# Patient Record
Sex: MALE | Race: WHITE
[De-identification: names, ages, dates, MRNs, and addresses within clinical notes are randomized per-mention and may not be internally consistent; named-entity substitution may affect disease eponyms.]

---

## 2021-11-30 ENCOUNTER — HOSPITAL ENCOUNTER (INPATIENT)
Dept: HOSPITAL 54 - ER | Age: 81
LOS: 15 days | Discharge: NURSING, REHAB OR LONG-TERM CARE | DRG: 881 | End: 2021-12-15
Attending: NURSE PRACTITIONER | Admitting: NURSE PRACTITIONER
Payer: MEDICARE

## 2021-11-30 VITALS — BODY MASS INDEX: 24.38 KG/M2 | WEIGHT: 180 LBS | HEIGHT: 72 IN

## 2021-11-30 DIAGNOSIS — G40.909: ICD-10-CM

## 2021-11-30 DIAGNOSIS — Z88.8: ICD-10-CM

## 2021-11-30 DIAGNOSIS — Z91.048: ICD-10-CM

## 2021-11-30 DIAGNOSIS — M20.42: ICD-10-CM

## 2021-11-30 DIAGNOSIS — F02.81: ICD-10-CM

## 2021-11-30 DIAGNOSIS — N17.0: ICD-10-CM

## 2021-11-30 DIAGNOSIS — F32.9: Primary | ICD-10-CM

## 2021-11-30 DIAGNOSIS — N18.9: ICD-10-CM

## 2021-11-30 DIAGNOSIS — S90.422A: ICD-10-CM

## 2021-11-30 DIAGNOSIS — F29: ICD-10-CM
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
## 2021-11-30 DIAGNOSIS — Z73.6: ICD-10-CM
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
## 2021-11-30 DIAGNOSIS — Z79.82: ICD-10-CM

## 2021-11-30 DIAGNOSIS — E78.5: ICD-10-CM

## 2021-11-30 DIAGNOSIS — G30.9: ICD-10-CM

## 2021-11-30 DIAGNOSIS — Z79.899: ICD-10-CM

## 2021-11-30 DIAGNOSIS — M20.41: ICD-10-CM

## 2021-11-30 DIAGNOSIS — F41.9: ICD-10-CM

## 2021-11-30 LAB
ALBUMIN SERPL BCP-MCNC: 4.3 G/DL (ref 3.4–5)
ALP SERPL-CCNC: 85 U/L (ref 46–116)
ALT SERPL W P-5'-P-CCNC: 41 U/L (ref 12–78)
APAP SERPL-MCNC: < 2 UG/ML (ref 10–30)
AST SERPL W P-5'-P-CCNC: 30 U/L (ref 15–37)
BASOPHILS # BLD AUTO: 0 K/UL (ref 0–0.2)
BASOPHILS NFR BLD AUTO: 0.4 % (ref 0–2)
BILIRUB DIRECT SERPL-MCNC: 0.2 MG/DL (ref 0–0.2)
BILIRUB SERPL-MCNC: 0.6 MG/DL (ref 0.2–1)
BILIRUB UR QL STRIP: (no result)
BUN SERPL-MCNC: 30 MG/DL (ref 7–18)
CALCIUM SERPL-MCNC: 9.4 MG/DL (ref 8.5–10.1)
CHLORIDE SERPL-SCNC: 104 MMOL/L (ref 98–107)
CO2 SERPL-SCNC: 28 MMOL/L (ref 21–32)
COLOR UR: YELLOW
CREAT SERPL-MCNC: 1.6 MG/DL (ref 0.6–1.3)
EOSINOPHIL NFR BLD AUTO: 1.6 % (ref 0–6)
ETHANOL SERPL-MCNC: < 3 MG/DL (ref 0–0)
GLUCOSE SERPL-MCNC: 103 MG/DL (ref 74–106)
HCT VFR BLD AUTO: 41 % (ref 39–51)
HGB BLD-MCNC: 13.6 G/DL (ref 13.5–17.5)
LYMPHOCYTES NFR BLD AUTO: 2.1 K/UL (ref 0.8–4.8)
LYMPHOCYTES NFR BLD AUTO: 20 % (ref 20–44)
MCHC RBC AUTO-ENTMCNC: 33 G/DL (ref 31–36)
MCV RBC AUTO: 94 FL (ref 80–96)
MONOCYTES NFR BLD AUTO: 0.9 K/UL (ref 0.1–1.3)
MONOCYTES NFR BLD AUTO: 8.7 % (ref 2–12)
NEUTROPHILS # BLD AUTO: 7.2 K/UL (ref 1.8–8.9)
NEUTROPHILS NFR BLD AUTO: 69.3 % (ref 43–81)
PH UR STRIP: 5.5 [PH] (ref 5–8)
PLATELET # BLD AUTO: 166 K/UL (ref 150–450)
POTASSIUM SERPL-SCNC: 4 MMOL/L (ref 3.5–5.1)
PROT SERPL-MCNC: 7.9 G/DL (ref 6.4–8.2)
RBC # BLD AUTO: 4.42 MIL/UL (ref 4.5–6)
SODIUM SERPL-SCNC: 142 MMOL/L (ref 136–145)
UROBILINOGEN UR STRIP-MCNC: 1 EU/DL
WBC #/AREA URNS HPF: (no result) /HPF (ref 0–3)
WBC NRBC COR # BLD AUTO: 10.4 K/UL (ref 4.3–11)

## 2021-11-30 PROCEDURE — G0480 DRUG TEST DEF 1-7 CLASSES: HCPCS

## 2021-11-30 RX ADMIN — TEMAZEPAM PRN MG: 7.5 CAPSULE ORAL at 22:27

## 2021-11-30 NOTE — NUR
5150: Patient will be admitted to [facility] for acute inpatient psychiatric treatment 
under the care of Dr. Roper on a 5150 as GD WRITTEN BY KALLI QUIROZ

## 2021-11-30 NOTE — NUR
PT BIB WIFE FROM [facility]ARE FACILITY FOR MEDICAL CLEARANCE PRIOR TO [facility] ADMISSION DUE 
TO AGITATION/AGGRESSIVENESS. PT A/OX2 WITH SOME CONFUSION. WIFE AT PT'S 
BEDSIDE. SAFETY MEASURES IN PLACE. PT COMPLIANT WITH CARE AT THIS TIME. 
CONNECTED PT TO POX AND MONITOR.

## 2021-12-01 VITALS — DIASTOLIC BLOOD PRESSURE: 78 MMHG | SYSTOLIC BLOOD PRESSURE: 137 MMHG

## 2021-12-01 VITALS — SYSTOLIC BLOOD PRESSURE: 134 MMHG | DIASTOLIC BLOOD PRESSURE: 58 MMHG

## 2021-12-01 VITALS — DIASTOLIC BLOOD PRESSURE: 70 MMHG | SYSTOLIC BLOOD PRESSURE: 132 MMHG

## 2021-12-01 VITALS — SYSTOLIC BLOOD PRESSURE: 119 MMHG | DIASTOLIC BLOOD PRESSURE: 72 MMHG

## 2021-12-01 LAB
ALBUMIN SERPL BCP-MCNC: 3.6 G/DL (ref 3.4–5)
ALP SERPL-CCNC: 75 U/L (ref 46–116)
ALT SERPL W P-5'-P-CCNC: 35 U/L (ref 12–78)
AST SERPL W P-5'-P-CCNC: 24 U/L (ref 15–37)
BILIRUB SERPL-MCNC: 0.6 MG/DL (ref 0.2–1)
BUN SERPL-MCNC: 28 MG/DL (ref 7–18)
CALCIUM SERPL-MCNC: 8.6 MG/DL (ref 8.5–10.1)
CHLORIDE SERPL-SCNC: 106 MMOL/L (ref 98–107)
CHOLEST SERPL-MCNC: 139 MG/DL (ref ?–200)
CO2 SERPL-SCNC: 27 MMOL/L (ref 21–32)
CREAT SERPL-MCNC: 1.4 MG/DL (ref 0.6–1.3)
GLUCOSE SERPL-MCNC: 92 MG/DL (ref 74–106)
HDLC SERPL-MCNC: 51 MG/DL (ref 40–60)
LDLC SERPL DIRECT ASSAY-MCNC: 70 MG/DL (ref 0–99)
POTASSIUM SERPL-SCNC: 3.7 MMOL/L (ref 3.5–5.1)
PROT SERPL-MCNC: 6.7 G/DL (ref 6.4–8.2)
SODIUM SERPL-SCNC: 140 MMOL/L (ref 136–145)
TRIGL SERPL-MCNC: 101 MG/DL (ref 30–150)

## 2021-12-01 RX ADMIN — TEMAZEPAM PRN MG: 7.5 CAPSULE ORAL at 20:33

## 2021-12-01 RX ADMIN — MEMANTINE HYDROCHLORIDE SCH MG: 5 TABLET ORAL at 09:13

## 2021-12-01 RX ADMIN — ESCITALOPRAM OXALATE SCH MG: 10 TABLET, FILM COATED ORAL at 09:13

## 2021-12-01 RX ADMIN — QUETIAPINE SCH MG: 25 TABLET, FILM COATED ORAL at 09:12

## 2021-12-01 RX ADMIN — QUETIAPINE SCH MG: 25 TABLET, FILM COATED ORAL at 17:10

## 2021-12-01 RX ADMIN — MEMANTINE HYDROCHLORIDE SCH MG: 5 TABLET ORAL at 17:10

## 2021-12-01 RX ADMIN — LEVETIRACETAM SCH MG: 250 TABLET, FILM COATED ORAL at 12:45

## 2021-12-01 RX ADMIN — GALANTAMINE HYDROBROMIDE SCH MG: 4 TABLET, FILM COATED ORAL at 18:48

## 2021-12-01 RX ADMIN — SIMVASTATIN SCH MG: 20 TABLET, FILM COATED ORAL at 17:12
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
## 2021-12-01 NOTE — NUR
SW Note:

SW met with patient to conduct therapy due to pt being tearful and sad. Pt appeared to be 
very confused and disorganized. Pt appeared frightful and stated "people are after him". SW 
attempted to comfort pt and pt appeared to be more cooperative and felt safe.

## 2021-12-01 NOTE — NUR
LIZBETH Family Contact:



LIZBETH spoke with patient's wife Samantha ([phone]) and gathered collateral. Wife expressed 
that pt has been diagnosed with Alzheimer for 5 years. She expressed that she is the DPOA 
and will send this writer documents. Wife wants pt back to [place] upon dc.
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
## 2021-12-01 NOTE — NUR
DPOA Document:



Patient's wife Samantha ([phone]) sent DPOA documents to this SW. SW placed in the chart.

## 2021-12-01 NOTE — NUR
RN-NOTES

NOTED PATIENT VERY ANXIOUS ,AGITATED REDIRECTED BUT PATIENT SWING HIS RIGHT HAND TO THE 
WRITER. KLONOPIN 0.25MG P.O GIVEN AS PRN ORDER. WILL CONT.MONITORING FOR SAFETY AND 
BEHAVIOR. ALL NEEDS ATTENDED AND ANTICIPATED.

## 2021-12-01 NOTE — NUR
LIZBETH Initial Discharge Plan:



Patient currently resides at [facility]. LIZBETH attempted to contact patient's wife Samantha 
([phone]) to gather collateral, unavailable, SW left a voicemail. LIZBETH spoke with Flori QUIROZ 
from [facility] ([phone]) who stated pt is welcomed back once stable. LIZBETH will work 
with the treatment team and family to coordinate appropriate treatment.

## 2021-12-01 NOTE — NUR
[facility]:



SW spoke with Flori QUIROZ from [facility] ([phone]) who stated pt is welcomed back 
once stable.

## 2021-12-01 NOTE — NUR
LIZBETH Note:



Patient's wife Samantha sent this writer list of meds, SW placed in chart.





         Medication List  (May 2021)



Levetiracetam ER (Keppra XR) - 750 mg/day

Escitalopram (Lexapro) - 30mg/day

Simvastatin (Zocor) - 20mg/day

Namenda XR - 28mg/day

Galantamine ER (Razadyne ER) - 24mg/day

Aspirin - 81mg/day

Multi-Vitamin (Mens Senior) - 1/day

Vitamin D3 - 2000iu/day

Fish Oil - 1000mg/day





          Occasionally (As Needed)



Tylenol

Allegra -12 hr

Mucinex - 12hr

Zicam (Zinc Lozenges)

Anti-itch Cream - (topical dry skin treatment)

## 2021-12-01 NOTE — NUR
LIZBETH Family Contact:



SW attempted to contact patient's wife Samantha ([phone]) to gather collateral, 
unavailable, SW left a voicemail.

## 2021-12-02 VITALS — SYSTOLIC BLOOD PRESSURE: 102 MMHG | DIASTOLIC BLOOD PRESSURE: 72 MMHG

## 2021-12-02 VITALS — DIASTOLIC BLOOD PRESSURE: 85 MMHG | SYSTOLIC BLOOD PRESSURE: 137 MMHG

## 2021-12-02 VITALS — DIASTOLIC BLOOD PRESSURE: 74 MMHG | SYSTOLIC BLOOD PRESSURE: 137 MMHG

## 2021-12-02 RX ADMIN — DIVALPROEX SODIUM SCH MG: 125 CAPSULE ORAL at 20:46

## 2021-12-02 RX ADMIN — QUETIAPINE SCH MG: 25 TABLET, FILM COATED ORAL at 10:04

## 2021-12-02 RX ADMIN — GALANTAMINE HYDROBROMIDE SCH MG: 4 TABLET, FILM COATED ORAL at 10:04

## 2021-12-02 RX ADMIN — VITAMIN D, TAB 1000IU (100/BT) SCH UNIT: 25 TAB at 10:03

## 2021-12-02 RX ADMIN — SIMVASTATIN SCH MG: 20 TABLET, FILM COATED ORAL at 17:34

## 2021-12-02 RX ADMIN — DIVALPROEX SODIUM SCH MG: 125 CAPSULE ORAL at 10:02

## 2021-12-02 RX ADMIN — ESCITALOPRAM OXALATE SCH MG: 10 TABLET, FILM COATED ORAL at 10:03

## 2021-12-02 RX ADMIN — QUETIAPINE SCH MG: 25 TABLET, FILM COATED ORAL at 16:23

## 2021-12-02 RX ADMIN — Medication SCH MG: at 10:03

## 2021-12-02 RX ADMIN — LEVETIRACETAM SCH MG: 250 TABLET, FILM COATED ORAL at 10:03

## 2021-12-02 RX ADMIN — MEMANTINE HYDROCHLORIDE SCH MG: 5 TABLET ORAL at 16:23

## 2021-12-02 RX ADMIN — THERA TABS SCH UDTAB: TAB at 10:03

## 2021-12-02 RX ADMIN — MEMANTINE HYDROCHLORIDE SCH MG: 5 TABLET ORAL at 10:03

## 2021-12-02 RX ADMIN — GALANTAMINE HYDROBROMIDE SCH MG: 4 TABLET, FILM COATED ORAL at 16:23

## 2021-12-02 NOTE — NUR
RN-NOTES

NOTED PATIENT VERY ANXIOUS TRYING TO OPEN MAIN DOOR OF THE UNIT. REDIRECTED AND REORIENTED, 
KLONOPIN 0.25MG P.O GIVEN AS PRN ORDER. WILL CONT.MONITORING FOR SAFETY AND BEHAVIOR. ALL 
NEEDS ATTENDED AND ANTICIPATED.

## 2021-12-03 VITALS — SYSTOLIC BLOOD PRESSURE: 147 MMHG | DIASTOLIC BLOOD PRESSURE: 79 MMHG

## 2021-12-03 VITALS — SYSTOLIC BLOOD PRESSURE: 144 MMHG | DIASTOLIC BLOOD PRESSURE: 79 MMHG

## 2021-12-03 VITALS — SYSTOLIC BLOOD PRESSURE: 130 MMHG | DIASTOLIC BLOOD PRESSURE: 74 MMHG

## 2021-12-03 LAB
BUN SERPL-MCNC: 29 MG/DL (ref 7–18)
CALCIUM SERPL-MCNC: 9 MG/DL (ref 8.5–10.1)
CHLORIDE SERPL-SCNC: 103 MMOL/L (ref 98–107)
CO2 SERPL-SCNC: 27 MMOL/L (ref 21–32)
CREAT SERPL-MCNC: 1.6 MG/DL (ref 0.6–1.3)
GLUCOSE SERPL-MCNC: 93 MG/DL (ref 74–106)
POTASSIUM SERPL-SCNC: 3.7 MMOL/L (ref 3.5–5.1)
SODIUM SERPL-SCNC: 140 MMOL/L (ref 136–145)

## 2021-12-03 RX ADMIN — GALANTAMINE HYDROBROMIDE SCH MG: 4 TABLET, FILM COATED ORAL at 09:44

## 2021-12-03 RX ADMIN — SIMVASTATIN SCH MG: 20 TABLET, FILM COATED ORAL at 17:16

## 2021-12-03 RX ADMIN — DIVALPROEX SODIUM SCH MG: 125 CAPSULE ORAL at 21:58

## 2021-12-03 RX ADMIN — ACETAMINOPHEN PRN MG: 325 TABLET ORAL at 11:18

## 2021-12-03 RX ADMIN — THERA TABS SCH UDTAB: TAB at 09:44

## 2021-12-03 RX ADMIN — MEMANTINE HYDROCHLORIDE SCH MG: 5 TABLET ORAL at 16:25

## 2021-12-03 RX ADMIN — QUETIAPINE SCH MG: 25 TABLET, FILM COATED ORAL at 16:25

## 2021-12-03 RX ADMIN — Medication SCH MG: at 09:44

## 2021-12-03 RX ADMIN — VITAMIN D, TAB 1000IU (100/BT) SCH UNIT: 25 TAB at 09:44

## 2021-12-03 RX ADMIN — LEVETIRACETAM SCH MG: 250 TABLET, FILM COATED ORAL at 09:43

## 2021-12-03 RX ADMIN — QUETIAPINE SCH MG: 25 TABLET, FILM COATED ORAL at 09:44

## 2021-12-03 RX ADMIN — GALANTAMINE HYDROBROMIDE SCH MG: 4 TABLET, FILM COATED ORAL at 16:25

## 2021-12-03 RX ADMIN — MEMANTINE HYDROCHLORIDE SCH MG: 5 TABLET ORAL at 09:44

## 2021-12-03 RX ADMIN — ESCITALOPRAM OXALATE SCH MG: 10 TABLET, FILM COATED ORAL at 09:44

## 2021-12-03 RX ADMIN — DIVALPROEX SODIUM SCH MG: 125 CAPSULE ORAL at 09:43

## 2021-12-03 NOTE — NUR
RN-NOTES

PATIENT IN THE DAY ROOM UP IN THE CHAIR WITH TABLE,AWAKE,ALERT TO NAME ONLY,GUARDED,CALM AND 
QUIET. NO ACUTE DISTRESS NOTED.  ENDORSED TO INCOMING NURSE FOR MONITORING AND CONTINUITY OF 
CARE.

## 2021-12-03 NOTE — NUR
RECEIVED PATIENT IN HALLWAY SITTING IN LEANA CHAIR, AWAKE A/O X1. BLUNTED AFFECT, BANGING ON 
CHAIR, YELLING, AGITATED, DISORGANIZED, CONFUSED, REFUSING REDIRECTION. POOR INSIGHT, POOR 
JUDGEMENT. IM ATIVAN 0.5ML, BENADRYL 25ML AND HALDOL 5ML GIVEN BY AM NURSE. WILL CONTINUE TO 
MONITOR Q15 MIN FOR MOOD, SAFETY AND BEHAVIOR.

## 2021-12-03 NOTE — NUR
[facility]PS RN NOTES:

PATIENT IS SITTING IN LEANA CHAIR IN DAY ROOM, AWAKE, A/O X1, CALM AND COOPERATIVE, ABLE TO 
FOLLOW COMMAND AND NO LONGER AGITATED OR AGGRESSIVE. V/S WNL. NO S/S OF RESPIRATORY 
DISTRESS. OFFERED SNACKS AND FLUID AS TOLERATED. WILL CONTINUE TO MONITOR.

## 2021-12-03 NOTE — NUR
RN-NOTES

 PATIENT C/O LOWER BACK PAIN. TYLENOL 650MG P.O GIVEN AS PRN ORDER. WILL CONT. MONITORING 
FOR SAFETY AND BEHAVIOR.

## 2021-12-03 NOTE — NUR
RN-NOTES

 PATIENT'S WIFE  GERARDO LUGO  [phone] MADE AWARE OF PATIENT'S BEHAVIOR AND THAT IM 
MEDICATIONS WAS GIVEN.
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
## 2021-12-03 NOTE — NUR
Pt. is very aggressive, screaming and yelling to staffs, fighting and posturing to the 
staffs. Louis MATA made aware and ordered Haldol 2 mg IM and Benadryl 25 mg IM.

## 2021-12-03 NOTE — NUR
RN CLOSING NOTE 



PATIENT AWAKE IN ROOM, BREATHING EVENLY AND UNLABORED, NO S/S OF DISTRESS OR SOB NOTED. 
PATIENT SLEPT WELL THROUGH THE NIGHT, NO SIGNIFICANT CHANGES AND NO BEHAVIORAL ISSUES NOTED. 
PATIENT WAS TEARFUL AT THE BEGINNING OF SHIFT, STATED HE WAS WAITING FOR HIS DAD BECAUSE HE 
HADN'T COME TO SEE HIM IN WEEKS. PT WAS REDIRECTABLE AND WENT TO SLEEP. PT WAS MED 
COMPLIANT, MEDICATIONS GIVEN AS ORDERED AND PATIENT NEEDS MET THROUGHOUT SHIFT. ENCOURAGED 
PO HYDRATION AND GAVE PATIENT SNACK. PT VERY CONFUSED THIS MORNING, KEPT ASKING HOW HE COULD 
GO DOWNSTAIRS AND LEAVE EVEN AFTER REORIENTING AND EXPLAINING THE SITUATION TO PATIENT. 
SAFETY MEASURES IN PLACE: BED LOCKED IN LOW POSITION, SIDE RAILS UP X 2, Q15 MIN CHECKS FOR 
SAFETY. WILL ENDORSE TO DAY SHIFT NURSE FOR CONTINUITY OF CARE.

## 2021-12-04 VITALS — SYSTOLIC BLOOD PRESSURE: 101 MMHG | DIASTOLIC BLOOD PRESSURE: 70 MMHG

## 2021-12-04 VITALS — DIASTOLIC BLOOD PRESSURE: 78 MMHG | SYSTOLIC BLOOD PRESSURE: 132 MMHG

## 2021-12-04 VITALS — SYSTOLIC BLOOD PRESSURE: 112 MMHG | DIASTOLIC BLOOD PRESSURE: 57 MMHG

## 2021-12-04 LAB
BASOPHILS # BLD AUTO: 0 K/UL (ref 0–0.2)
BASOPHILS NFR BLD AUTO: 0.3 % (ref 0–2)
BUN SERPL-MCNC: 29 MG/DL (ref 7–18)
CALCIUM SERPL-MCNC: 8.5 MG/DL (ref 8.5–10.1)
CHLORIDE SERPL-SCNC: 105 MMOL/L (ref 98–107)
CO2 SERPL-SCNC: 29 MMOL/L (ref 21–32)
CREAT SERPL-MCNC: 1.5 MG/DL (ref 0.6–1.3)
EOSINOPHIL NFR BLD AUTO: 4 % (ref 0–6)
GLUCOSE SERPL-MCNC: 92 MG/DL (ref 74–106)
HCT VFR BLD AUTO: 36 % (ref 39–51)
HGB BLD-MCNC: 12 G/DL (ref 13.5–17.5)
LYMPHOCYTES NFR BLD AUTO: 2 K/UL (ref 0.8–4.8)
LYMPHOCYTES NFR BLD AUTO: 30.1 % (ref 20–44)
MCHC RBC AUTO-ENTMCNC: 34 G/DL (ref 31–36)
MCV RBC AUTO: 92 FL (ref 80–96)
MONOCYTES NFR BLD AUTO: 0.8 K/UL (ref 0.1–1.3)
MONOCYTES NFR BLD AUTO: 11.1 % (ref 2–12)
NEUTROPHILS # BLD AUTO: 3.7 K/UL (ref 1.8–8.9)
NEUTROPHILS NFR BLD AUTO: 54.5 % (ref 43–81)
PLATELET # BLD AUTO: 134 K/UL (ref 150–450)
POTASSIUM SERPL-SCNC: 3.9 MMOL/L (ref 3.5–5.1)
RBC # BLD AUTO: 3.84 MIL/UL (ref 4.5–6)
SODIUM SERPL-SCNC: 140 MMOL/L (ref 136–145)
WBC NRBC COR # BLD AUTO: 6.8 K/UL (ref 4.3–11)

## 2021-12-04 RX ADMIN — MEMANTINE HYDROCHLORIDE SCH MG: 5 TABLET ORAL at 17:21

## 2021-12-04 RX ADMIN — ESCITALOPRAM OXALATE SCH MG: 10 TABLET, FILM COATED ORAL at 08:37

## 2021-12-04 RX ADMIN — MEMANTINE HYDROCHLORIDE SCH MG: 5 TABLET ORAL at 08:38

## 2021-12-04 RX ADMIN — SIMVASTATIN SCH MG: 20 TABLET, FILM COATED ORAL at 17:20

## 2021-12-04 RX ADMIN — GALANTAMINE HYDROBROMIDE SCH MG: 4 TABLET, FILM COATED ORAL at 08:37

## 2021-12-04 RX ADMIN — Medication SCH MG: at 08:37

## 2021-12-04 RX ADMIN — DIVALPROEX SODIUM SCH MG: 125 CAPSULE ORAL at 21:07

## 2021-12-04 RX ADMIN — THERA TABS SCH UDTAB: TAB at 08:37

## 2021-12-04 RX ADMIN — QUETIAPINE SCH MG: 25 TABLET, FILM COATED ORAL at 17:20

## 2021-12-04 RX ADMIN — QUETIAPINE SCH MG: 25 TABLET, FILM COATED ORAL at 08:38

## 2021-12-04 RX ADMIN — VITAMIN D, TAB 1000IU (100/BT) SCH UNIT: 25 TAB at 08:36

## 2021-12-04 RX ADMIN — TEMAZEPAM PRN MG: 7.5 CAPSULE ORAL at 21:07

## 2021-12-04 RX ADMIN — GALANTAMINE HYDROBROMIDE SCH MG: 4 TABLET, FILM COATED ORAL at 17:19

## 2021-12-04 RX ADMIN — LEVETIRACETAM SCH MG: 250 TABLET, FILM COATED ORAL at 08:37

## 2021-12-04 RX ADMIN — DIVALPROEX SODIUM SCH MG: 125 CAPSULE ORAL at 08:38

## 2021-12-05 VITALS — DIASTOLIC BLOOD PRESSURE: 63 MMHG | SYSTOLIC BLOOD PRESSURE: 105 MMHG

## 2021-12-05 VITALS — SYSTOLIC BLOOD PRESSURE: 139 MMHG | DIASTOLIC BLOOD PRESSURE: 87 MMHG

## 2021-12-05 VITALS — DIASTOLIC BLOOD PRESSURE: 91 MMHG | SYSTOLIC BLOOD PRESSURE: 146 MMHG

## 2021-12-05 RX ADMIN — QUETIAPINE SCH MG: 25 TABLET, FILM COATED ORAL at 08:09

## 2021-12-05 RX ADMIN — GALANTAMINE HYDROBROMIDE SCH MG: 4 TABLET, FILM COATED ORAL at 08:10

## 2021-12-05 RX ADMIN — VITAMIN D, TAB 1000IU (100/BT) SCH UNIT: 25 TAB at 08:09

## 2021-12-05 RX ADMIN — DIVALPROEX SODIUM SCH MG: 125 CAPSULE ORAL at 20:43

## 2021-12-05 RX ADMIN — GALANTAMINE HYDROBROMIDE SCH MG: 4 TABLET, FILM COATED ORAL at 17:15

## 2021-12-05 RX ADMIN — MEMANTINE HYDROCHLORIDE SCH MG: 5 TABLET ORAL at 08:10

## 2021-12-05 RX ADMIN — LEVETIRACETAM SCH MG: 250 TABLET, FILM COATED ORAL at 08:09

## 2021-12-05 RX ADMIN — DIVALPROEX SODIUM SCH MG: 125 CAPSULE ORAL at 08:09

## 2021-12-05 RX ADMIN — ESCITALOPRAM OXALATE SCH MG: 10 TABLET, FILM COATED ORAL at 08:09

## 2021-12-05 RX ADMIN — ACETAMINOPHEN PRN MG: 325 TABLET ORAL at 20:45

## 2021-12-05 RX ADMIN — SIMVASTATIN SCH MG: 20 TABLET, FILM COATED ORAL at 17:15

## 2021-12-05 RX ADMIN — TEMAZEPAM PRN MG: 7.5 CAPSULE ORAL at 21:27

## 2021-12-05 RX ADMIN — MEMANTINE HYDROCHLORIDE SCH MG: 5 TABLET ORAL at 17:15

## 2021-12-05 RX ADMIN — Medication SCH MG: at 08:09

## 2021-12-05 RX ADMIN — THERA TABS SCH UDTAB: TAB at 08:09

## 2021-12-05 RX ADMIN — QUETIAPINE SCH MG: 25 TABLET, FILM COATED ORAL at 17:15

## 2021-12-06 VITALS — DIASTOLIC BLOOD PRESSURE: 68 MMHG | SYSTOLIC BLOOD PRESSURE: 129 MMHG

## 2021-12-06 VITALS — DIASTOLIC BLOOD PRESSURE: 56 MMHG | SYSTOLIC BLOOD PRESSURE: 127 MMHG

## 2021-12-06 VITALS — DIASTOLIC BLOOD PRESSURE: 60 MMHG | SYSTOLIC BLOOD PRESSURE: 110 MMHG

## 2021-12-06 LAB
BILIRUB UR QL STRIP: NEGATIVE
COLOR UR: YELLOW
DEPRECATED SQUAMOUS URNS QL MICRO: (no result) /HPF
GLUCOSE UR STRIP-MCNC: NEGATIVE MG/DL
LEUKOCYTE ESTERASE UR QL STRIP: NEGATIVE
NITRITE UR QL STRIP: NEGATIVE
PH UR STRIP: 6 [PH] (ref 5–8)
PROT UR QL STRIP: NEGATIVE MG/DL
RBC #/AREA URNS HPF: (no result) /HPF (ref 0–2)
UROBILINOGEN UR STRIP-MCNC: 0.2 EU/DL
WBC #/AREA URNS HPF: (no result) /HPF
WBC #/AREA URNS HPF: (no result) /HPF (ref 0–3)

## 2021-12-06 RX ADMIN — GALANTAMINE HYDROBROMIDE SCH MG: 4 TABLET, FILM COATED ORAL at 16:34

## 2021-12-06 RX ADMIN — SIMVASTATIN SCH MG: 20 TABLET, FILM COATED ORAL at 17:00

## 2021-12-06 RX ADMIN — GALANTAMINE HYDROBROMIDE SCH MG: 4 TABLET, FILM COATED ORAL at 08:51

## 2021-12-06 RX ADMIN — QUETIAPINE SCH MG: 25 TABLET, FILM COATED ORAL at 08:54

## 2021-12-06 RX ADMIN — QUETIAPINE SCH MG: 25 TABLET, FILM COATED ORAL at 21:10

## 2021-12-06 RX ADMIN — DIVALPROEX SODIUM SCH MG: 125 CAPSULE ORAL at 21:10

## 2021-12-06 RX ADMIN — VITAMIN D, TAB 1000IU (100/BT) SCH UNIT: 25 TAB at 08:52

## 2021-12-06 RX ADMIN — Medication SCH MG: at 08:53

## 2021-12-06 RX ADMIN — LEVETIRACETAM SCH MG: 250 TABLET, FILM COATED ORAL at 08:52

## 2021-12-06 RX ADMIN — TEMAZEPAM PRN MG: 7.5 CAPSULE ORAL at 21:20

## 2021-12-06 RX ADMIN — MEMANTINE HYDROCHLORIDE SCH MG: 5 TABLET ORAL at 16:34

## 2021-12-06 RX ADMIN — ESCITALOPRAM OXALATE SCH MG: 10 TABLET, FILM COATED ORAL at 08:53

## 2021-12-06 RX ADMIN — MEMANTINE HYDROCHLORIDE SCH MG: 5 TABLET ORAL at 08:53

## 2021-12-06 RX ADMIN — DIVALPROEX SODIUM SCH MG: 125 CAPSULE ORAL at 08:52

## 2021-12-06 RX ADMIN — THERA TABS SCH UDTAB: TAB at 08:53

## 2021-12-06 NOTE — NUR
[facility]PS RN NOTES:

WEEKLY SKIN ASSESSMENT DONE. PATIENT HAD NEW SKIN ISSUES: SACRAL REDNESS, OLD R/L LOWER ARM 
SCABS AND BRUISES. WOUND CONSULT ORDERED, PICTURES TAKEN AND PLACED IN PATIENT CHART.

## 2021-12-06 NOTE — NUR
AT APPROXIMATELY 1646 PM, THE PT WAS FOUND BY THE CNA AND RN SITTING DOWN ON THE FLOOR WITH 
HIS HANDS ON THE FLOOR AND WITH HIS BACK AND HEAD LEANING AGAINST THE TABLE .  NO SIGNS OF 
DISTRESS, NO BLEEDING OR INJURY NOTED. PT REGAINED GAIT AND WAS PLACED IN CHAIR.  STAT CT 
SCAN OF THE HEAD WAS ORDERED BY DNP. PT UNCOOPERATIVE IN RADIOLOGY ROOM. UNABLE TO DO CT 
SCAN. DNP AND CHARGE NURSE AWARE. PER DNP, OBSERVE PT FOR SIGNS AND SYMPTOMS OF BLEEDING, 
CONFUSION AND ENCEPHALOPATHY. SUPERVISOR AND MANAGER AWARE OF THE FALL. VS STABLE /77 
HR 99

## 2021-12-06 NOTE — NUR
GPS RICHIE NOTE

PT IN G/C UNABLE TO GO TO SLEEP, RESTORIL 25 MG PO GIVEN. CONTINUE TO MONITOR HIM. 

-------------------------------------------------------------------------------

Addendum: 12/07/21 at 0536 by FRITZ BROWN RN

-------------------------------------------------------------------------------

DUPILACATE ENTRY

## 2021-12-06 NOTE — NUR
GPS RN NOTE

PT IN G/C RESTLESS, AND UNABLE TO SLEEP, RETORIL 7.5 MG PO GIVEN. CONTINUE TO MONITOR HIM.

## 2021-12-07 VITALS — DIASTOLIC BLOOD PRESSURE: 68 MMHG | SYSTOLIC BLOOD PRESSURE: 131 MMHG

## 2021-12-07 VITALS — DIASTOLIC BLOOD PRESSURE: 59 MMHG | SYSTOLIC BLOOD PRESSURE: 136 MMHG

## 2021-12-07 RX ADMIN — MEMANTINE HYDROCHLORIDE SCH MG: 5 TABLET ORAL at 09:03

## 2021-12-07 RX ADMIN — DIVALPROEX SODIUM SCH MG: 125 CAPSULE ORAL at 09:03

## 2021-12-07 RX ADMIN — VITAMIN D, TAB 1000IU (100/BT) SCH UNIT: 25 TAB at 09:03

## 2021-12-07 RX ADMIN — QUETIAPINE SCH MG: 25 TABLET, FILM COATED ORAL at 21:25

## 2021-12-07 RX ADMIN — DIVALPROEX SODIUM SCH MG: 250 TABLET, DELAYED RELEASE ORAL at 17:32

## 2021-12-07 RX ADMIN — ESCITALOPRAM OXALATE SCH MG: 10 TABLET, FILM COATED ORAL at 09:02

## 2021-12-07 RX ADMIN — GALANTAMINE HYDROBROMIDE SCH MG: 4 TABLET, FILM COATED ORAL at 17:32

## 2021-12-07 RX ADMIN — MEMANTINE HYDROCHLORIDE SCH MG: 5 TABLET ORAL at 17:32

## 2021-12-07 RX ADMIN — THERA TABS SCH UDTAB: TAB at 09:03

## 2021-12-07 RX ADMIN — LEVETIRACETAM SCH MG: 250 TABLET, FILM COATED ORAL at 09:04

## 2021-12-07 RX ADMIN — SIMVASTATIN SCH MG: 20 TABLET, FILM COATED ORAL at 18:33

## 2021-12-07 RX ADMIN — GALANTAMINE HYDROBROMIDE SCH MG: 4 TABLET, FILM COATED ORAL at 09:03

## 2021-12-07 RX ADMIN — QUETIAPINE SCH MG: 25 TABLET, FILM COATED ORAL at 09:02

## 2021-12-07 RX ADMIN — Medication SCH MG: at 09:03

## 2021-12-07 NOTE — NUR
RN NOTE: REFUSED VITALS



PATIENT REFUSED VITALS X 3, EASILY RESTLESS, WANTS TO SLEEP & NOT TO BE BOTHERED. NO ACUTE 
CHANGES NOTED. WILL CONTINUE TO MONITOR FOR ANY CHANGE OF CONDITION.

## 2021-12-08 VITALS — DIASTOLIC BLOOD PRESSURE: 71 MMHG | SYSTOLIC BLOOD PRESSURE: 114 MMHG

## 2021-12-08 VITALS — DIASTOLIC BLOOD PRESSURE: 81 MMHG | SYSTOLIC BLOOD PRESSURE: 134 MMHG

## 2021-12-08 VITALS — SYSTOLIC BLOOD PRESSURE: 114 MMHG | DIASTOLIC BLOOD PRESSURE: 71 MMHG

## 2021-12-08 VITALS — DIASTOLIC BLOOD PRESSURE: 56 MMHG | SYSTOLIC BLOOD PRESSURE: 118 MMHG

## 2021-12-08 RX ADMIN — GALANTAMINE HYDROBROMIDE SCH MG: 4 TABLET, FILM COATED ORAL at 17:37

## 2021-12-08 RX ADMIN — GALANTAMINE HYDROBROMIDE SCH MG: 4 TABLET, FILM COATED ORAL at 09:17

## 2021-12-08 RX ADMIN — THERA TABS SCH UDTAB: TAB at 08:45

## 2021-12-08 RX ADMIN — Medication PRN OZ: at 06:38

## 2021-12-08 RX ADMIN — Medication SCH OZ: at 08:33

## 2021-12-08 RX ADMIN — VITAMIN D, TAB 1000IU (100/BT) SCH UNIT: 25 TAB at 08:42

## 2021-12-08 RX ADMIN — DIVALPROEX SODIUM SCH MG: 250 TABLET, DELAYED RELEASE ORAL at 17:37

## 2021-12-08 RX ADMIN — QUETIAPINE SCH MG: 25 TABLET, FILM COATED ORAL at 08:44

## 2021-12-08 RX ADMIN — DIVALPROEX SODIUM SCH MG: 250 TABLET, DELAYED RELEASE ORAL at 08:43

## 2021-12-08 RX ADMIN — MEMANTINE HYDROCHLORIDE SCH MG: 5 TABLET ORAL at 17:38

## 2021-12-08 RX ADMIN — Medication PRN OZ: at 21:37

## 2021-12-08 RX ADMIN — ESCITALOPRAM OXALATE SCH MG: 10 TABLET, FILM COATED ORAL at 08:42

## 2021-12-08 RX ADMIN — QUETIAPINE SCH MG: 25 TABLET, FILM COATED ORAL at 22:11

## 2021-12-08 RX ADMIN — LEVETIRACETAM SCH MG: 250 TABLET, FILM COATED ORAL at 08:42

## 2021-12-08 RX ADMIN — Medication SCH MG: at 08:42

## 2021-12-08 RX ADMIN — SIMVASTATIN SCH MG: 20 TABLET, FILM COATED ORAL at 17:37

## 2021-12-08 RX ADMIN — MEMANTINE HYDROCHLORIDE SCH MG: 5 TABLET ORAL at 08:42

## 2021-12-08 NOTE — NUR
RN NOTE



PATIENT IS UP IN A LEANA CHAIR IN THE HALLWAY, WIFE VISITING THE PATIENT AT THIS TIME. 
OFFERED CUP OF WATER TO THE PATIENT, WIFE IS ASSISTING AND REDIRECTING THE PATIENT TO FINISH 
DRINKING WATER. NO ACUTE CHANGES NOTED. WILL CONTINUE TO MONITOR.

## 2021-12-08 NOTE — NUR
WOUND CARE CONSULT: PT SEEN FOR OPEN SKIN ON LEFT GREAT TOE, PRESENT ON ADMISSION. DR NORRIS NOTIFIED OF DPM CONSULT REQUEST. MD IN AGREEMENT WITH PLAN OF CARE.

## 2021-12-08 NOTE — NUR
RN NOTE



PATIENT SLEPT WELL AT NIGHT. NO BEHAVIOR EPISODES NOTED THROUGHOUT THE SHIFT. REDIRECTABLE.

## 2021-12-08 NOTE — NUR
RN NOTE: ANXIETY



PATIENT IS ANXIOUS, RESTLESS AND AGITATED. PRN KLONOPIN 0.25 MG PO ADMINISTERED AS ORDERED. 
WILL MONITOR CLOSELY FOR ANY CHANGE OF CONDITION.

## 2021-12-09 VITALS — DIASTOLIC BLOOD PRESSURE: 79 MMHG | SYSTOLIC BLOOD PRESSURE: 145 MMHG

## 2021-12-09 VITALS — SYSTOLIC BLOOD PRESSURE: 125 MMHG | DIASTOLIC BLOOD PRESSURE: 76 MMHG

## 2021-12-09 VITALS — SYSTOLIC BLOOD PRESSURE: 137 MMHG | DIASTOLIC BLOOD PRESSURE: 67 MMHG

## 2021-12-09 RX ADMIN — DIVALPROEX SODIUM SCH MG: 250 TABLET, DELAYED RELEASE ORAL at 16:37

## 2021-12-09 RX ADMIN — LEVETIRACETAM SCH MG: 250 TABLET, FILM COATED ORAL at 09:08

## 2021-12-09 RX ADMIN — DIVALPROEX SODIUM SCH MG: 250 TABLET, DELAYED RELEASE ORAL at 09:07

## 2021-12-09 RX ADMIN — GALANTAMINE HYDROBROMIDE SCH MG: 4 TABLET, FILM COATED ORAL at 09:07

## 2021-12-09 RX ADMIN — VITAMIN D, TAB 1000IU (100/BT) SCH UNIT: 25 TAB at 09:08

## 2021-12-09 RX ADMIN — SIMVASTATIN SCH MG: 20 TABLET, FILM COATED ORAL at 17:09

## 2021-12-09 RX ADMIN — Medication SCH OZ: at 09:38

## 2021-12-09 RX ADMIN — QUETIAPINE SCH MG: 25 TABLET, FILM COATED ORAL at 21:28

## 2021-12-09 RX ADMIN — MEMANTINE HYDROCHLORIDE SCH MG: 5 TABLET ORAL at 16:37

## 2021-12-09 RX ADMIN — GALANTAMINE HYDROBROMIDE SCH MG: 4 TABLET, FILM COATED ORAL at 16:49

## 2021-12-09 RX ADMIN — QUETIAPINE SCH MG: 25 TABLET, FILM COATED ORAL at 09:08

## 2021-12-09 RX ADMIN — ESCITALOPRAM OXALATE SCH MG: 10 TABLET, FILM COATED ORAL at 09:08

## 2021-12-09 RX ADMIN — Medication SCH MG: at 09:08

## 2021-12-09 RX ADMIN — THERA TABS SCH UDTAB: TAB at 09:43

## 2021-12-09 RX ADMIN — MEMANTINE HYDROCHLORIDE SCH MG: 5 TABLET ORAL at 09:07

## 2021-12-09 NOTE — NUR
RN NOTES: PT. BEHAVIOR  ANXIOUS EASILY AGITATED PARANOID UNCOOPERTIVE, CONFUSED DISORGANIZED 
NON COMPLIANT WITH DIRECTIONS AND PLAN OF CARE . PT. REFUSED TO STAYING IN THE BED 
ATTEMPTING TO GETING OUT THE BED VERY OFTEN REFUSING TO STAY IN BED ,  PT. GAIT VERY 
UNSTEADY AND HIGH FALL RISKS, PT. OBSERVED CLOSELY ALL NEEDS WERE MET, BANGING SIDE RAILS , 
PT.  SKIN IS  VERY FRAGILE AND NOTED WITH  MULTIPLE SKIN DISCOLORATIONS FROM PREVIOUSLY ,  
PT. REFUSED SKIN ASSESSMENT , ENCOURAGED BUT PT. BEHAVIOR VERY UNCOOERTIVE ,AND STRONGLY 
REFUSED, WILL CONTINUE  WITH PLAN OF CARE.

## 2021-12-09 NOTE — NUR
RN-NOTES

NOTED PATIENT VERY ANXIOUS,SCREAMING AND YELLING FOCUS ON GOING HOME. REDIRECTED AND 
REORIENTED, KLONOPIN 0.25MG P.O GIVEN AS PRN ORDER. WILL CONT.MONITORING FOR SAFETY AND 
BEHAVIOR.

## 2021-12-10 VITALS — DIASTOLIC BLOOD PRESSURE: 78 MMHG | SYSTOLIC BLOOD PRESSURE: 142 MMHG

## 2021-12-10 VITALS — SYSTOLIC BLOOD PRESSURE: 119 MMHG | DIASTOLIC BLOOD PRESSURE: 73 MMHG

## 2021-12-10 RX ADMIN — DIVALPROEX SODIUM SCH MG: 250 TABLET, DELAYED RELEASE ORAL at 17:28

## 2021-12-10 RX ADMIN — LEVETIRACETAM SCH MG: 250 TABLET, FILM COATED ORAL at 08:55

## 2021-12-10 RX ADMIN — SIMVASTATIN SCH MG: 20 TABLET, FILM COATED ORAL at 17:28

## 2021-12-10 RX ADMIN — GALANTAMINE HYDROBROMIDE SCH MG: 4 TABLET, FILM COATED ORAL at 08:54

## 2021-12-10 RX ADMIN — DIVALPROEX SODIUM SCH MG: 250 TABLET, DELAYED RELEASE ORAL at 08:54

## 2021-12-10 RX ADMIN — MEMANTINE HYDROCHLORIDE SCH MG: 5 TABLET ORAL at 08:54

## 2021-12-10 RX ADMIN — VITAMIN D, TAB 1000IU (100/BT) SCH UNIT: 25 TAB at 08:54

## 2021-12-10 RX ADMIN — QUETIAPINE SCH MG: 25 TABLET, FILM COATED ORAL at 08:54

## 2021-12-10 RX ADMIN — THERA TABS SCH UDTAB: TAB at 08:51

## 2021-12-10 RX ADMIN — Medication SCH OZ: at 08:55

## 2021-12-10 RX ADMIN — TEMAZEPAM PRN MG: 7.5 CAPSULE ORAL at 22:07

## 2021-12-10 RX ADMIN — Medication SCH MG: at 08:54

## 2021-12-10 RX ADMIN — ESCITALOPRAM OXALATE SCH MG: 10 TABLET, FILM COATED ORAL at 08:54

## 2021-12-10 RX ADMIN — QUETIAPINE SCH MG: 25 TABLET, FILM COATED ORAL at 21:24

## 2021-12-10 RX ADMIN — GALANTAMINE HYDROBROMIDE SCH MG: 4 TABLET, FILM COATED ORAL at 17:28

## 2021-12-10 RX ADMIN — MEMANTINE HYDROCHLORIDE SCH MG: 5 TABLET ORAL at 17:28

## 2021-12-10 NOTE — NUR
Mr. Rankin had a female visitor tonight

he was conversig and smiling during her visit

he sit sit in a chair and will grab for the person walkinh by him or thepiece of equipment 
going by him

he is alert but confused

taking med he is cooperative

## 2021-12-10 NOTE — NUR
[facility]:

LIZBETH spoke with Flori director of nursing at [place] ([phone]) and FaceTimed with pt. They 
are welcoming of taking pt back upon dc. LIZBETH sent updated H & P, progress notes, and 
medication list.

## 2021-12-10 NOTE — NUR
RN NOTES: PT. BEHAVIOR THROUGH OUT SHIFT EASILY AGITED , DISORGNIZED , PT. RESTING HIS ROOM 
AT THIS TIME ENCOURAGED PO FLUIDS AND SNACKS AS TOLERTED  WITH PLAN OF CARE.

## 2021-12-11 VITALS — DIASTOLIC BLOOD PRESSURE: 60 MMHG | SYSTOLIC BLOOD PRESSURE: 126 MMHG

## 2021-12-11 VITALS — SYSTOLIC BLOOD PRESSURE: 147 MMHG | DIASTOLIC BLOOD PRESSURE: 80 MMHG

## 2021-12-11 VITALS — DIASTOLIC BLOOD PRESSURE: 87 MMHG | SYSTOLIC BLOOD PRESSURE: 139 MMHG

## 2021-12-11 RX ADMIN — QUETIAPINE SCH MG: 25 TABLET, FILM COATED ORAL at 21:06

## 2021-12-11 RX ADMIN — ESCITALOPRAM OXALATE SCH MG: 10 TABLET, FILM COATED ORAL at 09:11

## 2021-12-11 RX ADMIN — MEMANTINE HYDROCHLORIDE SCH MG: 5 TABLET ORAL at 09:11

## 2021-12-11 RX ADMIN — QUETIAPINE SCH MG: 25 TABLET, FILM COATED ORAL at 09:11

## 2021-12-11 RX ADMIN — GALANTAMINE HYDROBROMIDE SCH MG: 4 TABLET, FILM COATED ORAL at 16:33

## 2021-12-11 RX ADMIN — Medication SCH OZ: at 09:12

## 2021-12-11 RX ADMIN — DIVALPROEX SODIUM SCH MG: 250 TABLET, DELAYED RELEASE ORAL at 16:34

## 2021-12-11 RX ADMIN — VITAMIN D, TAB 1000IU (100/BT) SCH UNIT: 25 TAB at 09:11

## 2021-12-11 RX ADMIN — MEMANTINE HYDROCHLORIDE SCH MG: 5 TABLET ORAL at 16:34

## 2021-12-11 RX ADMIN — GALANTAMINE HYDROBROMIDE SCH MG: 4 TABLET, FILM COATED ORAL at 09:11

## 2021-12-11 RX ADMIN — Medication SCH MG: at 09:11

## 2021-12-11 RX ADMIN — TEMAZEPAM PRN MG: 7.5 CAPSULE ORAL at 22:10

## 2021-12-11 RX ADMIN — LEVETIRACETAM SCH MG: 250 TABLET, FILM COATED ORAL at 09:11

## 2021-12-11 RX ADMIN — SIMVASTATIN SCH MG: 20 TABLET, FILM COATED ORAL at 17:49

## 2021-12-11 RX ADMIN — THERA TABS SCH UDTAB: TAB at 09:11

## 2021-12-11 RX ADMIN — DIVALPROEX SODIUM SCH MG: 250 TABLET, DELAYED RELEASE ORAL at 09:10

## 2021-12-11 NOTE — NUR
GPS-RN NOTES: INSOMNIA



PATIENT UNABLE TO SLEEP. PRN RESTORIL 7.5MG PO GIVEN AS ORDERED. WILL CONTINUE TO MONITOR.

## 2021-12-11 NOTE — NUR
GPS-RN NOTES: ANXIETY



PATIENT IS ANXIOUS AND RESTLESS. PRN KLONOPIN 0.25MG PO GIVEN AS ORDERED. WILL CONTINUE TO 
MONITOR FOR PATIENT'S SAFETY.

## 2021-12-11 NOTE — NUR
closing notes:  friendly and cooperative thru this 12 hours.  noted when a nurse walk by him 
he will attemot to grab her arm, whenwalked by with my computer he attempted to grab the it.

sat in the chair until 1AM then placed him in bed alarm on.  placed in bed earlier but he 
got up, alarm sounding off to alert nurse he was oob.  2nd time placed in bed he went to 
sleep

## 2021-12-12 VITALS — SYSTOLIC BLOOD PRESSURE: 152 MMHG | DIASTOLIC BLOOD PRESSURE: 78 MMHG

## 2021-12-12 VITALS — SYSTOLIC BLOOD PRESSURE: 125 MMHG | DIASTOLIC BLOOD PRESSURE: 84 MMHG

## 2021-12-12 VITALS — DIASTOLIC BLOOD PRESSURE: 85 MMHG | SYSTOLIC BLOOD PRESSURE: 138 MMHG

## 2021-12-12 RX ADMIN — ESCITALOPRAM OXALATE SCH MG: 10 TABLET, FILM COATED ORAL at 09:14

## 2021-12-12 RX ADMIN — QUETIAPINE SCH MG: 25 TABLET, FILM COATED ORAL at 09:14

## 2021-12-12 RX ADMIN — QUETIAPINE SCH MG: 25 TABLET, FILM COATED ORAL at 21:15

## 2021-12-12 RX ADMIN — MEMANTINE HYDROCHLORIDE SCH MG: 5 TABLET ORAL at 09:14

## 2021-12-12 RX ADMIN — VITAMIN D, TAB 1000IU (100/BT) SCH UNIT: 25 TAB at 08:18

## 2021-12-12 RX ADMIN — GALANTAMINE HYDROBROMIDE SCH MG: 4 TABLET, FILM COATED ORAL at 08:18

## 2021-12-12 RX ADMIN — DIVALPROEX SODIUM SCH MG: 250 TABLET, DELAYED RELEASE ORAL at 09:14

## 2021-12-12 RX ADMIN — Medication SCH MG: at 08:17

## 2021-12-12 RX ADMIN — DIVALPROEX SODIUM SCH MG: 250 TABLET, DELAYED RELEASE ORAL at 17:42

## 2021-12-12 RX ADMIN — GALANTAMINE HYDROBROMIDE SCH MG: 4 TABLET, FILM COATED ORAL at 17:43

## 2021-12-12 RX ADMIN — LEVETIRACETAM SCH MG: 250 TABLET, FILM COATED ORAL at 09:00

## 2021-12-12 RX ADMIN — THERA TABS SCH UDTAB: TAB at 08:18

## 2021-12-12 RX ADMIN — Medication SCH OZ: at 08:18

## 2021-12-12 RX ADMIN — SIMVASTATIN SCH MG: 20 TABLET, FILM COATED ORAL at 17:42

## 2021-12-12 RX ADMIN — TEMAZEPAM PRN MG: 7.5 CAPSULE ORAL at 22:42

## 2021-12-12 RX ADMIN — MEMANTINE HYDROCHLORIDE SCH MG: 5 TABLET ORAL at 17:42

## 2021-12-12 NOTE — NUR
RN OPENING NOTE



RECEIVED PT IN BED RESTING. PT DOES NOT EXHIBIT S/SX OF ACUTE

DISTRESS AT THIS TIME.PT IS CONFUSED AND DISORIENTED,

REQUIRES CONSTANT REDIRECTION, AND REORIENTATION TO ENVIRONMENT. PT

REMAINS UNPREDICTABLE, REQUIRES ENCOURAGEMENT AND ASSISTANCE REGARDING ADLS

AND SELF-CARE. PT IS PROVIDED A SAFE AND COMFORTABLE ENVIRONMENT. WILL

CONTINUE MONITORING Q15MIN FOR SAFETY AND BEHAVIOR.

## 2021-12-13 VITALS — DIASTOLIC BLOOD PRESSURE: 55 MMHG | SYSTOLIC BLOOD PRESSURE: 122 MMHG

## 2021-12-13 VITALS — SYSTOLIC BLOOD PRESSURE: 143 MMHG | DIASTOLIC BLOOD PRESSURE: 58 MMHG

## 2021-12-13 VITALS — SYSTOLIC BLOOD PRESSURE: 131 MMHG | DIASTOLIC BLOOD PRESSURE: 66 MMHG

## 2021-12-13 RX ADMIN — VITAMIN D, TAB 1000IU (100/BT) SCH UNIT: 25 TAB at 08:15

## 2021-12-13 RX ADMIN — SIMVASTATIN SCH MG: 20 TABLET, FILM COATED ORAL at 17:29

## 2021-12-13 RX ADMIN — MEMANTINE HYDROCHLORIDE SCH MG: 5 TABLET ORAL at 08:14

## 2021-12-13 RX ADMIN — GALANTAMINE HYDROBROMIDE SCH MG: 4 TABLET, FILM COATED ORAL at 16:19

## 2021-12-13 RX ADMIN — LEVETIRACETAM SCH MG: 250 TABLET, FILM COATED ORAL at 08:14

## 2021-12-13 RX ADMIN — DIVALPROEX SODIUM SCH MG: 250 TABLET, DELAYED RELEASE ORAL at 08:14

## 2021-12-13 RX ADMIN — MEMANTINE HYDROCHLORIDE SCH MG: 5 TABLET ORAL at 16:20

## 2021-12-13 RX ADMIN — THERA TABS SCH UDTAB: TAB at 08:15

## 2021-12-13 RX ADMIN — ESCITALOPRAM OXALATE SCH MG: 10 TABLET, FILM COATED ORAL at 08:15

## 2021-12-13 RX ADMIN — Medication SCH OZ: at 08:28

## 2021-12-13 RX ADMIN — TEMAZEPAM PRN MG: 7.5 CAPSULE ORAL at 22:41

## 2021-12-13 RX ADMIN — DIVALPROEX SODIUM SCH MG: 250 TABLET, DELAYED RELEASE ORAL at 16:19

## 2021-12-13 RX ADMIN — QUETIAPINE SCH MG: 25 TABLET, FILM COATED ORAL at 22:41

## 2021-12-13 RX ADMIN — GALANTAMINE HYDROBROMIDE SCH MG: 4 TABLET, FILM COATED ORAL at 10:03

## 2021-12-13 RX ADMIN — Medication SCH MG: at 08:15

## 2021-12-13 NOTE — NUR
GPS-RN NOTES:



WEEKLY SKIN BODY ASSESSMENT DONE, NOTED WITH LEFT LATERAL LOWER LEG SKIN DISCOLORATION AND 
LEFT UPPER ARM BRUISES. PHOTO PLACED IN THE CHART. NO SKIN BREAKDOWN NOTED, SKIN INTACT. 
WILL CONTINUE TO MONITOR FOR ANY ADVERSE SKIN CHANGES EVERY SHIFT AND PRN. WILL ENDORSE TO 
AM SHIFT FOR CONTINUITY OF CARE.

## 2021-12-13 NOTE — NUR
RN NOTE- PT LABILE AGITATED OPPOSITIONAL TO CARE THOUGH WAS ABLE TO HAVE PT TAKE CRUSHED 
MEDS THIS MORNING. VS STABLE, REQUIRES REDIRECTION AND REORIENTATION

## 2021-12-13 NOTE — NUR
SS group note: 

SW met with pt.in the activity room and invited him to participate in group about: "What 
they hope will improve after their treatment at [facility]".  Pt. is sitting in gerichair. Pt. 
is confused with euthymic mood. Pt. is not appropriate for group at this time as he is 
unable to engage in meaningful conversation. SW will monitor pt. and invite pt. to the next 
group if appropriate.

## 2021-12-13 NOTE — NUR
GPS-RN NOTES: ANXIETY



PATIENT IS VERY ANXIOUS AND AGITATED. PRN KLONOPIN 0.25MG PO GIVEN. WILL CONTINUE TO MONITOR 
PT'S SAFETY.

## 2021-12-14 VITALS — SYSTOLIC BLOOD PRESSURE: 100 MMHG | DIASTOLIC BLOOD PRESSURE: 54 MMHG

## 2021-12-14 VITALS — SYSTOLIC BLOOD PRESSURE: 125 MMHG | DIASTOLIC BLOOD PRESSURE: 99 MMHG

## 2021-12-14 VITALS — DIASTOLIC BLOOD PRESSURE: 70 MMHG | SYSTOLIC BLOOD PRESSURE: 119 MMHG

## 2021-12-14 RX ADMIN — DIVALPROEX SODIUM SCH MG: 250 TABLET, DELAYED RELEASE ORAL at 09:01

## 2021-12-14 RX ADMIN — DIVALPROEX SODIUM SCH MG: 250 TABLET, DELAYED RELEASE ORAL at 17:16

## 2021-12-14 RX ADMIN — VITAMIN D, TAB 1000IU (100/BT) SCH UNIT: 25 TAB at 09:01

## 2021-12-14 RX ADMIN — Medication SCH OZ: at 08:56

## 2021-12-14 RX ADMIN — GALANTAMINE HYDROBROMIDE SCH MG: 4 TABLET, FILM COATED ORAL at 17:16

## 2021-12-14 RX ADMIN — THERA TABS SCH UDTAB: TAB at 09:02

## 2021-12-14 RX ADMIN — ESCITALOPRAM OXALATE SCH MG: 10 TABLET, FILM COATED ORAL at 09:01

## 2021-12-14 RX ADMIN — MEMANTINE HYDROCHLORIDE SCH MG: 5 TABLET ORAL at 17:16

## 2021-12-14 RX ADMIN — SIMVASTATIN SCH MG: 20 TABLET, FILM COATED ORAL at 17:16

## 2021-12-14 RX ADMIN — TEMAZEPAM PRN MG: 7.5 CAPSULE ORAL at 23:14

## 2021-12-14 RX ADMIN — Medication SCH MG: at 09:02

## 2021-12-14 RX ADMIN — LEVETIRACETAM SCH MG: 250 TABLET, FILM COATED ORAL at 09:02

## 2021-12-14 RX ADMIN — GALANTAMINE HYDROBROMIDE SCH MG: 4 TABLET, FILM COATED ORAL at 09:02

## 2021-12-14 RX ADMIN — QUETIAPINE SCH MG: 25 TABLET, FILM COATED ORAL at 22:22

## 2021-12-14 RX ADMIN — MEMANTINE HYDROCHLORIDE SCH MG: 5 TABLET ORAL at 09:01

## 2021-12-14 NOTE — NUR
PATIENT IS ANXIOUS AND RESTLESS. PRN KLONOPIN 0.25MG PO GIVEN AS ORDERED. WILL CONTINUE TO 
MONITOR FOR PATIENT'S SAFETY.

## 2021-12-15 VITALS — SYSTOLIC BLOOD PRESSURE: 137 MMHG | DIASTOLIC BLOOD PRESSURE: 78 MMHG

## 2021-12-15 RX ADMIN — VITAMIN D, TAB 1000IU (100/BT) SCH UNIT: 25 TAB at 09:42

## 2021-12-15 RX ADMIN — MEMANTINE HYDROCHLORIDE SCH MG: 5 TABLET ORAL at 09:42

## 2021-12-15 RX ADMIN — GALANTAMINE HYDROBROMIDE SCH MG: 4 TABLET, FILM COATED ORAL at 09:42

## 2021-12-15 RX ADMIN — Medication SCH MG: at 09:42

## 2021-12-15 RX ADMIN — THERA TABS SCH UDTAB: TAB at 09:42

## 2021-12-15 RX ADMIN — Medication SCH OZ: at 09:43

## 2021-12-15 RX ADMIN — ESCITALOPRAM OXALATE SCH MG: 10 TABLET, FILM COATED ORAL at 09:42

## 2021-12-15 RX ADMIN — DIVALPROEX SODIUM SCH MG: 250 TABLET, DELAYED RELEASE ORAL at 09:42

## 2021-12-15 RX ADMIN — LEVETIRACETAM SCH MG: 250 TABLET, FILM COATED ORAL at 09:42

## 2021-12-15 NOTE — NUR
Discharge Note:



Patient will be discharged to [facility] 
located at [address], [place] [address]; ([phone]). Patients wife 
Samantha ([phone]) will  pt at 1PM. Admissions at [facility], [facility] and 
[facility] stated pt is welcomed today. Patient appears to be alert and oriented x2. Patient 
denies suicidal or homicidal ideation. Patient denies visual/auditory hallucinations. 
Patient will follow up with primary doctor Dr. Arsenio Rodriguez at the facility who will 
monitor and provide the psychotropic medications. Presents with euthymic mood and congruent 
affect.

## 2021-12-15 NOTE — NUR
RN-NOTES

PATIENT HAD A DISCHARGE ORDER FROM DR. DISLA/ LILY JACOB ( PSYCHIATRIST) LILY WELCH ( 
INTERNIST) CLEARED PATIENT FOR DISCHARGE. PATIENT A/O X2 AMBULATORY STEADY GAIT. PATIENT DID 
NOT VERBALIZE SI/HI,DENIES VISUAL/AUDITORY HALLUCINATIONS AT THE TIME OF DISCHARGE. ALL 
DISCHARGE MEDICATIONS WAS REVIEWED WITH PATIENT'S WIFE ( GERARDO) WITH UNDERSTANDING. ALL 
DISCHARGE PAPERS INCLUDING RX  AN DONE YELLOW RING WAS GIVEN TO GERARDO .  PATIENT WAS 
DISCHARGE TO [facility].  PATIENT DID REFUSED FULL BODY 
ASSESSMENT PRIOR TO DISCHARGE. PATIENT WAS WHEELED DOWN THE LOBBY BY THE ACTIVITY STAFF FOR 
SAFETY.  PATIENT LEFT THE UNIT IN STABLE CONDITION A/O X2,NO ACUTE DISTRESS NOTED. BY 
WIFE GERARDO VIA PRIVATE CAR.

## 2022-03-04 ENCOUNTER — HOSPITAL ENCOUNTER (INPATIENT)
Dept: HOSPITAL 12 - ER | Age: 82
LOS: 6 days | Discharge: NURSING, REHAB OR LONG-TERM CARE | DRG: 885 | End: 2022-03-10
Payer: MEDICARE

## 2022-03-04 VITALS — BODY MASS INDEX: 22.88 KG/M2 | WEIGHT: 151 LBS | HEIGHT: 68 IN

## 2022-03-04 DIAGNOSIS — Z74.09: ICD-10-CM

## 2022-03-04 DIAGNOSIS — Z87.19: ICD-10-CM

## 2022-03-04 DIAGNOSIS — D63.8: ICD-10-CM

## 2022-03-04 DIAGNOSIS — N17.0: ICD-10-CM

## 2022-03-04 DIAGNOSIS — Z91.83: ICD-10-CM

## 2022-03-04 DIAGNOSIS — Z79.899: ICD-10-CM

## 2022-03-04 DIAGNOSIS — F02.81: ICD-10-CM

## 2022-03-04 DIAGNOSIS — Z66: ICD-10-CM

## 2022-03-04 DIAGNOSIS — E78.5: ICD-10-CM

## 2022-03-04 DIAGNOSIS — D68.59: ICD-10-CM

## 2022-03-04 DIAGNOSIS — G30.9: ICD-10-CM

## 2022-03-04 DIAGNOSIS — R53.1: ICD-10-CM

## 2022-03-04 DIAGNOSIS — F29: Primary | ICD-10-CM

## 2022-03-04 LAB
ALP SERPL-CCNC: 74 U/L (ref 50–136)
ALT SERPL W/O P-5'-P-CCNC: 14 U/L (ref 16–63)
APAP SERPL-MCNC: < 2 UG/ML (ref 10–30)
AST SERPL-CCNC: 6 U/L (ref 15–37)
BILIRUB DIRECT SERPL-MCNC: 0.2 MG/DL (ref 0–0.2)
BILIRUB SERPL-MCNC: 0.6 MG/DL (ref 0.2–1)
BUN SERPL-MCNC: 19 MG/DL (ref 7–18)
CHLORIDE SERPL-SCNC: 100 MMOL/L (ref 98–107)
CO2 SERPL-SCNC: 30 MMOL/L (ref 21–32)
CREAT SERPL-MCNC: 1.2 MG/DL (ref 0.6–1.3)
ETHANOL SERPL-MCNC: < 3 MG/DL (ref 0–0)
GLUCOSE SERPL-MCNC: 112 MG/DL (ref 74–106)
HCT VFR BLD AUTO: 37.5 % (ref 36.7–47.1)
MCH RBC QN AUTO: 30.9 UUG (ref 23.8–33.4)
MCV RBC AUTO: 94.5 FL (ref 73–96.2)
PLATELET # BLD AUTO: 111 K/UL (ref 152–348)
POTASSIUM SERPL-SCNC: 4.8 MMOL/L (ref 3.5–5.1)
TSH SERPL DL<=0.005 MIU/L-ACNC: 2.99 MIU/ML (ref 0.36–3.74)
WS STN SPEC: 7.3 G/DL (ref 6.4–8.2)

## 2022-03-04 PROCEDURE — A4663 DIALYSIS BLOOD PRESSURE CUFF: HCPCS

## 2022-03-04 PROCEDURE — G0480 DRUG TEST DEF 1-7 CLASSES: HCPCS

## 2022-03-04 PROCEDURE — A6209 FOAM DRSG <=16 SQ IN W/O BDR: HCPCS

## 2022-03-05 VITALS — DIASTOLIC BLOOD PRESSURE: 71 MMHG | SYSTOLIC BLOOD PRESSURE: 127 MMHG

## 2022-03-05 VITALS — SYSTOLIC BLOOD PRESSURE: 123 MMHG | DIASTOLIC BLOOD PRESSURE: 79 MMHG

## 2022-03-05 VITALS — DIASTOLIC BLOOD PRESSURE: 79 MMHG | SYSTOLIC BLOOD PRESSURE: 151 MMHG

## 2022-03-05 RX ADMIN — LORAZEPAM PRN MG: 1 TABLET ORAL at 19:49

## 2022-03-05 RX ADMIN — VITAMIN D, TAB 1000IU (100/BT) SCH UNIT: 25 TAB at 10:00

## 2022-03-05 RX ADMIN — SENNOSIDES SCH TAB: 8.6 TABLET, COATED ORAL at 10:00

## 2022-03-05 RX ADMIN — SENNOSIDES SCH TAB: 8.6 TABLET, COATED ORAL at 16:20

## 2022-03-05 RX ADMIN — ASPIRIN SCH MG: 81 TABLET, CHEWABLE ORAL at 10:00

## 2022-03-05 RX ADMIN — POLYETHYLENE GLYCOL 3350 SCH GM: 17 POWDER, FOR SOLUTION ORAL at 09:00

## 2022-03-05 RX ADMIN — Medication PRN MG: at 21:07

## 2022-03-05 RX ADMIN — MEMANTINE HYDROCHLORIDE SCH MG: 10 TABLET ORAL at 16:19

## 2022-03-05 RX ADMIN — MEMANTINE HYDROCHLORIDE SCH MG: 10 TABLET ORAL at 10:00

## 2022-03-06 VITALS — DIASTOLIC BLOOD PRESSURE: 69 MMHG | SYSTOLIC BLOOD PRESSURE: 119 MMHG

## 2022-03-06 VITALS — DIASTOLIC BLOOD PRESSURE: 50 MMHG | SYSTOLIC BLOOD PRESSURE: 95 MMHG

## 2022-03-06 RX ADMIN — ASPIRIN SCH MG: 81 TABLET, CHEWABLE ORAL at 08:41

## 2022-03-06 RX ADMIN — LORAZEPAM PRN MG: 1 TABLET ORAL at 21:06

## 2022-03-06 RX ADMIN — MEMANTINE HYDROCHLORIDE SCH MG: 10 TABLET ORAL at 08:41

## 2022-03-06 RX ADMIN — SENNOSIDES SCH TAB: 8.6 TABLET, COATED ORAL at 16:03

## 2022-03-06 RX ADMIN — LORAZEPAM PRN MG: 1 TABLET ORAL at 15:44

## 2022-03-06 RX ADMIN — SENNOSIDES SCH TAB: 8.6 TABLET, COATED ORAL at 08:41

## 2022-03-06 RX ADMIN — MEMANTINE HYDROCHLORIDE SCH MG: 10 TABLET ORAL at 16:03

## 2022-03-06 RX ADMIN — POLYETHYLENE GLYCOL 3350 SCH GM: 17 POWDER, FOR SOLUTION ORAL at 08:41

## 2022-03-06 RX ADMIN — VITAMIN D, TAB 1000IU (100/BT) SCH UNIT: 25 TAB at 08:41

## 2022-03-06 RX ADMIN — Medication PRN MG: at 15:34

## 2022-03-06 RX ADMIN — RIVASTIGMINE TARTRATE SCH MG: 1.5 CAPSULE ORAL at 21:05

## 2022-03-07 VITALS — SYSTOLIC BLOOD PRESSURE: 121 MMHG | DIASTOLIC BLOOD PRESSURE: 57 MMHG

## 2022-03-07 VITALS — SYSTOLIC BLOOD PRESSURE: 104 MMHG | DIASTOLIC BLOOD PRESSURE: 59 MMHG

## 2022-03-07 VITALS — DIASTOLIC BLOOD PRESSURE: 104 MMHG | SYSTOLIC BLOOD PRESSURE: 137 MMHG

## 2022-03-07 LAB
CHOLEST SERPL-MCNC: 233 MG/DL (ref ?–200)
HDLC SERPL-MCNC: 68 MG/DL (ref 40–60)
TRIGL SERPL-MCNC: 94 MG/DL (ref 30–150)

## 2022-03-07 RX ADMIN — VITAMIN D, TAB 1000IU (100/BT) SCH UNIT: 25 TAB at 08:11

## 2022-03-07 RX ADMIN — ANORECTAL OINTMENT SCH GM: 15.7; .44; 24; 20.6 OINTMENT TOPICAL at 20:04

## 2022-03-07 RX ADMIN — MEMANTINE HYDROCHLORIDE SCH MG: 10 TABLET ORAL at 17:02

## 2022-03-07 RX ADMIN — Medication PRN MG: at 12:43

## 2022-03-07 RX ADMIN — RIVASTIGMINE TARTRATE SCH MG: 1.5 CAPSULE ORAL at 08:11

## 2022-03-07 RX ADMIN — MEMANTINE HYDROCHLORIDE SCH MG: 10 TABLET ORAL at 08:12

## 2022-03-07 RX ADMIN — ASPIRIN SCH MG: 81 TABLET, CHEWABLE ORAL at 08:11

## 2022-03-07 RX ADMIN — RIVASTIGMINE TARTRATE SCH MG: 1.5 CAPSULE ORAL at 20:02

## 2022-03-07 RX ADMIN — POLYETHYLENE GLYCOL 3350 SCH GM: 17 POWDER, FOR SOLUTION ORAL at 08:11

## 2022-03-07 RX ADMIN — SENNOSIDES SCH TAB: 8.6 TABLET, COATED ORAL at 08:11

## 2022-03-07 RX ADMIN — ANORECTAL OINTMENT SCH GM: 15.7; .44; 24; 20.6 OINTMENT TOPICAL at 08:12

## 2022-03-07 RX ADMIN — TEMAZEPAM PRN MG: 7.5 CAPSULE ORAL at 22:48

## 2022-03-07 RX ADMIN — SENNOSIDES SCH TAB: 8.6 TABLET, COATED ORAL at 17:02

## 2022-03-07 RX ADMIN — Medication SCH ML: at 17:00

## 2022-03-08 VITALS — SYSTOLIC BLOOD PRESSURE: 149 MMHG | DIASTOLIC BLOOD PRESSURE: 79 MMHG

## 2022-03-08 VITALS — SYSTOLIC BLOOD PRESSURE: 97 MMHG | DIASTOLIC BLOOD PRESSURE: 63 MMHG

## 2022-03-08 VITALS — DIASTOLIC BLOOD PRESSURE: 58 MMHG | SYSTOLIC BLOOD PRESSURE: 119 MMHG

## 2022-03-08 RX ADMIN — LORAZEPAM PRN MG: 1 TABLET ORAL at 04:10

## 2022-03-08 RX ADMIN — ANORECTAL OINTMENT SCH GM: 15.7; .44; 24; 20.6 OINTMENT TOPICAL at 09:07

## 2022-03-08 RX ADMIN — MEMANTINE HYDROCHLORIDE SCH MG: 10 TABLET ORAL at 17:08

## 2022-03-08 RX ADMIN — RIVASTIGMINE TARTRATE SCH MG: 1.5 CAPSULE ORAL at 09:05

## 2022-03-08 RX ADMIN — RIVASTIGMINE TARTRATE SCH MG: 1.5 CAPSULE ORAL at 20:06

## 2022-03-08 RX ADMIN — POLYETHYLENE GLYCOL 3350 SCH GM: 17 POWDER, FOR SOLUTION ORAL at 09:05

## 2022-03-08 RX ADMIN — Medication SCH ML: at 09:06

## 2022-03-08 RX ADMIN — MEMANTINE HYDROCHLORIDE SCH MG: 10 TABLET ORAL at 09:06

## 2022-03-08 RX ADMIN — ANORECTAL OINTMENT SCH GM: 15.7; .44; 24; 20.6 OINTMENT TOPICAL at 20:07

## 2022-03-08 RX ADMIN — VITAMIN D, TAB 1000IU (100/BT) SCH UNIT: 25 TAB at 09:06

## 2022-03-08 RX ADMIN — SENNOSIDES SCH TAB: 8.6 TABLET, COATED ORAL at 17:08

## 2022-03-08 RX ADMIN — SENNOSIDES SCH TAB: 8.6 TABLET, COATED ORAL at 09:06

## 2022-03-08 RX ADMIN — TEMAZEPAM PRN MG: 7.5 CAPSULE ORAL at 20:42

## 2022-03-08 RX ADMIN — Medication SCH ML: at 17:09

## 2022-03-08 RX ADMIN — ASPIRIN SCH MG: 81 TABLET, CHEWABLE ORAL at 09:05

## 2022-03-09 VITALS — SYSTOLIC BLOOD PRESSURE: 118 MMHG | DIASTOLIC BLOOD PRESSURE: 79 MMHG

## 2022-03-09 VITALS — SYSTOLIC BLOOD PRESSURE: 152 MMHG | DIASTOLIC BLOOD PRESSURE: 65 MMHG

## 2022-03-09 VITALS — SYSTOLIC BLOOD PRESSURE: 119 MMHG | DIASTOLIC BLOOD PRESSURE: 70 MMHG

## 2022-03-09 RX ADMIN — MEMANTINE HYDROCHLORIDE SCH MG: 10 TABLET ORAL at 17:25

## 2022-03-09 RX ADMIN — SENNOSIDES SCH TAB: 8.6 TABLET, COATED ORAL at 17:25

## 2022-03-09 RX ADMIN — POLYETHYLENE GLYCOL 3350 SCH GM: 17 POWDER, FOR SOLUTION ORAL at 08:21

## 2022-03-09 RX ADMIN — RIVASTIGMINE TARTRATE SCH MG: 1.5 CAPSULE ORAL at 08:20

## 2022-03-09 RX ADMIN — VITAMIN D, TAB 1000IU (100/BT) SCH UNIT: 25 TAB at 08:20

## 2022-03-09 RX ADMIN — Medication SCH ML: at 17:56

## 2022-03-09 RX ADMIN — RIVASTIGMINE TARTRATE SCH MG: 1.5 CAPSULE ORAL at 19:58

## 2022-03-09 RX ADMIN — ANORECTAL OINTMENT SCH GM: 15.7; .44; 24; 20.6 OINTMENT TOPICAL at 19:58

## 2022-03-09 RX ADMIN — ASPIRIN SCH MG: 81 TABLET, CHEWABLE ORAL at 08:19

## 2022-03-09 RX ADMIN — ANORECTAL OINTMENT SCH GM: 15.7; .44; 24; 20.6 OINTMENT TOPICAL at 08:22

## 2022-03-09 RX ADMIN — MEMANTINE HYDROCHLORIDE SCH MG: 10 TABLET ORAL at 08:19

## 2022-03-09 RX ADMIN — LORAZEPAM PRN MG: 1 TABLET ORAL at 19:57

## 2022-03-09 RX ADMIN — SENNOSIDES SCH TAB: 8.6 TABLET, COATED ORAL at 08:20

## 2022-03-09 RX ADMIN — Medication SCH ML: at 08:21

## 2022-03-10 VITALS — SYSTOLIC BLOOD PRESSURE: 105 MMHG | DIASTOLIC BLOOD PRESSURE: 54 MMHG

## 2022-03-10 VITALS — SYSTOLIC BLOOD PRESSURE: 112 MMHG | DIASTOLIC BLOOD PRESSURE: 67 MMHG

## 2022-03-10 RX ADMIN — VITAMIN D, TAB 1000IU (100/BT) SCH UNIT: 25 TAB at 08:42

## 2022-03-10 RX ADMIN — Medication PRN MG: at 03:10

## 2022-03-10 RX ADMIN — POLYETHYLENE GLYCOL 3350 SCH GM: 17 POWDER, FOR SOLUTION ORAL at 08:43

## 2022-03-10 RX ADMIN — ANORECTAL OINTMENT SCH GM: 15.7; .44; 24; 20.6 OINTMENT TOPICAL at 08:58

## 2022-03-10 RX ADMIN — MEMANTINE HYDROCHLORIDE SCH MG: 10 TABLET ORAL at 17:00

## 2022-03-10 RX ADMIN — SENNOSIDES SCH TAB: 8.6 TABLET, COATED ORAL at 08:43

## 2022-03-10 RX ADMIN — RIVASTIGMINE TARTRATE SCH MG: 1.5 CAPSULE ORAL at 08:43

## 2022-03-10 RX ADMIN — ASPIRIN SCH MG: 81 TABLET, CHEWABLE ORAL at 08:43

## 2022-03-10 RX ADMIN — MEMANTINE HYDROCHLORIDE SCH MG: 10 TABLET ORAL at 08:43

## 2022-03-10 RX ADMIN — Medication SCH ML: at 08:44

## 2022-03-10 RX ADMIN — Medication SCH ML: at 17:00

## 2022-03-10 RX ADMIN — SENNOSIDES SCH TAB: 8.6 TABLET, COATED ORAL at 17:00

## 2022-06-28 ENCOUNTER — HOSPITAL ENCOUNTER (INPATIENT)
Dept: HOSPITAL 12 - ER | Age: 82
LOS: 15 days | Discharge: NURSING, REHAB OR LONG-TERM CARE | DRG: 885 | End: 2022-07-13
Payer: MEDICARE

## 2022-06-28 VITALS — WEIGHT: 175 LBS | BODY MASS INDEX: 26.52 KG/M2 | HEIGHT: 68 IN

## 2022-06-28 DIAGNOSIS — F03.90: ICD-10-CM

## 2022-06-28 DIAGNOSIS — N17.0: ICD-10-CM

## 2022-06-28 DIAGNOSIS — F29: Primary | ICD-10-CM

## 2022-06-28 DIAGNOSIS — E78.5: ICD-10-CM

## 2022-06-28 DIAGNOSIS — Z20.822: ICD-10-CM

## 2022-06-28 DIAGNOSIS — Z79.899: ICD-10-CM

## 2022-06-28 DIAGNOSIS — D63.8: ICD-10-CM

## 2022-06-28 DIAGNOSIS — F39: ICD-10-CM

## 2022-06-28 DIAGNOSIS — E86.0: ICD-10-CM

## 2022-06-28 LAB
ALP SERPL-CCNC: 80 U/L (ref 50–136)
ALT SERPL W/O P-5'-P-CCNC: 15 U/L (ref 16–63)
APAP SERPL-MCNC: < 2 UG/ML (ref 10–30)
AST SERPL-CCNC: 10 U/L (ref 15–37)
BILIRUB DIRECT SERPL-MCNC: 0.1 MG/DL (ref 0–0.2)
BILIRUB SERPL-MCNC: 0.3 MG/DL (ref 0.2–1)
BUN SERPL-MCNC: 32 MG/DL (ref 7–18)
CHLORIDE SERPL-SCNC: 104 MMOL/L (ref 98–107)
CO2 SERPL-SCNC: 29 MMOL/L (ref 21–32)
CREAT SERPL-MCNC: 1.2 MG/DL (ref 0.6–1.3)
ETHANOL SERPL-MCNC: < 3 MG/DL (ref 0–0)
GLUCOSE SERPL-MCNC: 103 MG/DL (ref 74–106)
HCT VFR BLD AUTO: 32.7 % (ref 36.7–47.1)
MCH RBC QN AUTO: 31 UUG (ref 23.8–33.4)
MCV RBC AUTO: 91.4 FL (ref 73–96.2)
PLATELET # BLD AUTO: 151 K/UL (ref 152–348)
POTASSIUM SERPL-SCNC: 4.3 MMOL/L (ref 3.5–5.1)
WS STN SPEC: 7.1 G/DL (ref 6.4–8.2)

## 2022-06-28 PROCEDURE — C1758 CATHETER, URETERAL: HCPCS

## 2022-06-28 PROCEDURE — A4663 DIALYSIS BLOOD PRESSURE CUFF: HCPCS

## 2022-06-28 PROCEDURE — G0480 DRUG TEST DEF 1-7 CLASSES: HCPCS

## 2022-06-28 RX ADMIN — TEMAZEPAM PRN MG: 7.5 CAPSULE ORAL at 23:18

## 2022-06-28 RX ADMIN — LORAZEPAM PRN MG: 1 TABLET ORAL at 23:57
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
## 2022-06-28 NOTE — NUR
Pt. admitted to [facility]  , under care of Dr. Hansen and Nicolette

Dx: psychosis

Belongs List completed

## 2022-06-29 VITALS — SYSTOLIC BLOOD PRESSURE: 144 MMHG | DIASTOLIC BLOOD PRESSURE: 113 MMHG

## 2022-06-29 VITALS — SYSTOLIC BLOOD PRESSURE: 123 MMHG | DIASTOLIC BLOOD PRESSURE: 65 MMHG

## 2022-06-29 VITALS — DIASTOLIC BLOOD PRESSURE: 84 MMHG | SYSTOLIC BLOOD PRESSURE: 159 MMHG

## 2022-06-29 LAB
ALP SERPL-CCNC: 87 U/L (ref 50–136)
ALT SERPL W/O P-5'-P-CCNC: 19 U/L (ref 16–63)
AST SERPL-CCNC: 15 U/L (ref 15–37)
BILIRUB SERPL-MCNC: 0.5 MG/DL (ref 0.2–1)
BUN SERPL-MCNC: 26 MG/DL (ref 7–18)
CHLORIDE SERPL-SCNC: 101 MMOL/L (ref 98–107)
CHOLEST SERPL-MCNC: 182 MG/DL (ref ?–200)
CO2 SERPL-SCNC: 30 MMOL/L (ref 21–32)
CREAT SERPL-MCNC: 1.3 MG/DL (ref 0.6–1.3)
GLUCOSE SERPL-MCNC: 107 MG/DL (ref 74–106)
HDLC SERPL-MCNC: 70 MG/DL (ref 40–60)
POTASSIUM SERPL-SCNC: 4.1 MMOL/L (ref 3.5–5.1)
TRIGL SERPL-MCNC: 78 MG/DL (ref 30–150)
WS STN SPEC: 7.4 G/DL (ref 6.4–8.2)

## 2022-06-29 RX ADMIN — DIVALPROEX SODIUM SCH MG: 125 CAPSULE ORAL at 22:00

## 2022-06-29 RX ADMIN — LORAZEPAM PRN MG: 1 TABLET ORAL at 21:19

## 2022-06-29 RX ADMIN — TEMAZEPAM PRN MG: 7.5 CAPSULE ORAL at 22:00

## 2022-06-29 RX ADMIN — RIVASTIGMINE TARTRATE SCH MG: 1.5 CAPSULE ORAL at 22:00

## 2022-06-29 NOTE — NUR
Received patient awake in the hallway. A X 1 to self. Pt. is confused, disoriented, 
restless, disorganized, combative with nursing care. Pt states "Please get me out of here. I 
need to go" "I need to get up and go" "Come on let me out" "Why you don't help me?" Pt. 
requires total care. Reassurance given. Fall and safety precautions implemented.

## 2022-06-30 VITALS — DIASTOLIC BLOOD PRESSURE: 84 MMHG | SYSTOLIC BLOOD PRESSURE: 126 MMHG

## 2022-06-30 VITALS — DIASTOLIC BLOOD PRESSURE: 77 MMHG | SYSTOLIC BLOOD PRESSURE: 152 MMHG

## 2022-06-30 RX ADMIN — DIVALPROEX SODIUM SCH MG: 125 CAPSULE ORAL at 08:49

## 2022-06-30 RX ADMIN — LORAZEPAM PRN MG: 1 TABLET ORAL at 20:36

## 2022-06-30 RX ADMIN — RIVASTIGMINE TARTRATE SCH MG: 1.5 CAPSULE ORAL at 08:49

## 2022-06-30 RX ADMIN — Medication SCH UNITS: at 08:48

## 2022-06-30 RX ADMIN — DIVALPROEX SODIUM SCH MG: 125 CAPSULE ORAL at 20:36

## 2022-06-30 RX ADMIN — SENNOSIDES SCH TAB: 8.6 TABLET, COATED ORAL at 08:49

## 2022-06-30 RX ADMIN — SENNOSIDES SCH TAB: 8.6 TABLET, COATED ORAL at 17:44

## 2022-06-30 RX ADMIN — RIVASTIGMINE TARTRATE SCH MG: 1.5 CAPSULE ORAL at 20:36

## 2022-06-30 RX ADMIN — ASPIRIN SCH MG: 81 TABLET, CHEWABLE ORAL at 08:49
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
## 2022-06-30 NOTE — NUR
Received patient sleeping in his room. A X 1 to self. Pt. is confused, restless, gets 
agitated with nursing care. Pt states "I need to go to work, my car is outside" "Why don't 
you help me to get out of here?" "Hey guys, let's go outside". Pt. requires total assistance 
with ADL. Reality orientation provided. Fall and safety precautions implemented.

## 2022-06-30 NOTE — NUR
BREN Initial Discharge Note:



Pt currently resides at [facility]. BREN will be in contact with [place] and pt's 
wife, Ellen ([phone]) and son, Esequiel, to discuss discharge plan. BREN will continue to 
work with pt, family and MD to ensure a safe and proper discharge plan.

## 2022-07-01 VITALS — SYSTOLIC BLOOD PRESSURE: 135 MMHG | DIASTOLIC BLOOD PRESSURE: 54 MMHG

## 2022-07-01 VITALS — SYSTOLIC BLOOD PRESSURE: 139 MMHG | DIASTOLIC BLOOD PRESSURE: 48 MMHG

## 2022-07-01 VITALS — SYSTOLIC BLOOD PRESSURE: 131 MMHG | DIASTOLIC BLOOD PRESSURE: 70 MMHG

## 2022-07-01 RX ADMIN — DIVALPROEX SODIUM SCH MG: 125 CAPSULE ORAL at 20:22

## 2022-07-01 RX ADMIN — SENNOSIDES SCH TAB: 8.6 TABLET, COATED ORAL at 09:42

## 2022-07-01 RX ADMIN — Medication SCH UNITS: at 09:43

## 2022-07-01 RX ADMIN — DIVALPROEX SODIUM SCH MG: 125 CAPSULE ORAL at 09:42

## 2022-07-01 RX ADMIN — LORAZEPAM PRN MG: 1 TABLET ORAL at 16:42

## 2022-07-01 RX ADMIN — SENNOSIDES SCH TAB: 8.6 TABLET, COATED ORAL at 17:24

## 2022-07-01 RX ADMIN — RIVASTIGMINE TARTRATE SCH MG: 1.5 CAPSULE ORAL at 20:22

## 2022-07-01 RX ADMIN — ASPIRIN SCH MG: 81 TABLET, CHEWABLE ORAL at 09:42

## 2022-07-01 RX ADMIN — LORAZEPAM PRN MG: 1 TABLET ORAL at 08:30

## 2022-07-01 RX ADMIN — RIVASTIGMINE TARTRATE SCH MG: 1.5 CAPSULE ORAL at 09:42

## 2022-07-01 NOTE — NUR
BREN Discharge Update:



BREN spoke with pt's , Aydee ([phone]) and  Eleanor ([phone]) at 
[facility] who stated that pt is welcome back upon discharge. Pt's son, Esequiel 
([phone]) is aware and agreeable. BREN informed Esequiel that per pt's psychiatrist, Dr. Bishop, pt is not cleared for discharge today per families request. Esequiel is aware and 
agreeable that pt will continue care at [facility] until Dr. Bishop clears pt for safe 
discharge.

## 2022-07-01 NOTE — NUR
Patient is given Ativan 1 mg at 16:42 for agitation and anxiety, will be monitored for 
effectiveness.

## 2022-07-01 NOTE — NUR
Patient is given Ativan 1 mg at 08:30 for agitation and combativeness, will be monitored for 
effectiveness.

## 2022-07-01 NOTE — NUR
BREN Discharge Update:



BREN spoke with pt's , Aydee at [facility] who stated that pt cannot 
return to [place] due to current behavior. Isaiah stated that pt's wife, Ellen 
([phone]) and son, Esequiel ([phone]) who are both DPOA's of the pt have found a 
senior living called [facility] ([phone]) and speak to Zhou or Christian. BREN 
will contacted the family and discuss the discharge plan.

## 2022-07-01 NOTE — NUR
Received patient sleeping in his room. A X 1 to person. Pt. is anxious, restless, confused. 
Pt states "Let's us go" "I need to go home" "My family depends on me" Pt. ambulates with 
assistance, gets agitated when nursing care is provided. Reassurance given. Fall and safety 
precautions implemented.
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
## 2022-07-01 NOTE — NUR
Firearms Report:



 completed and submitted a [employer] firearms report for 5150 a danger to others and 
grave disability certifications. A copy of report has been placed in patient chart.

## 2022-07-02 VITALS — SYSTOLIC BLOOD PRESSURE: 142 MMHG | DIASTOLIC BLOOD PRESSURE: 87 MMHG

## 2022-07-02 VITALS — DIASTOLIC BLOOD PRESSURE: 72 MMHG | SYSTOLIC BLOOD PRESSURE: 135 MMHG

## 2022-07-02 RX ADMIN — DIVALPROEX SODIUM SCH MG: 250 TABLET, DELAYED RELEASE ORAL at 08:30

## 2022-07-02 RX ADMIN — SENNOSIDES SCH TAB: 8.6 TABLET, COATED ORAL at 08:30

## 2022-07-02 RX ADMIN — RIVASTIGMINE TARTRATE SCH MG: 1.5 CAPSULE ORAL at 08:30

## 2022-07-02 RX ADMIN — RIVASTIGMINE TARTRATE SCH MG: 1.5 CAPSULE ORAL at 21:52

## 2022-07-02 RX ADMIN — LORAZEPAM PRN MG: 1 TABLET ORAL at 06:34

## 2022-07-02 RX ADMIN — ASPIRIN SCH MG: 81 TABLET, CHEWABLE ORAL at 08:30

## 2022-07-02 RX ADMIN — TEMAZEPAM PRN MG: 7.5 CAPSULE ORAL at 21:52

## 2022-07-02 RX ADMIN — Medication SCH UNITS: at 08:31

## 2022-07-02 RX ADMIN — DIVALPROEX SODIUM SCH MG: 250 TABLET, DELAYED RELEASE ORAL at 12:30

## 2022-07-02 RX ADMIN — DIVALPROEX SODIUM SCH MG: 250 TABLET, DELAYED RELEASE ORAL at 16:55

## 2022-07-02 RX ADMIN — ACETAMINOPHEN PRN MG: 325 TABLET ORAL at 06:34

## 2022-07-02 RX ADMIN — SENNOSIDES SCH TAB: 8.6 TABLET, COATED ORAL at 16:55

## 2022-07-02 NOTE — NUR
GPS: RECEIVED PT ON CHAIR FOR SAFETY. PT COMPLIANT WITH MEDS. PT CONFUSED AND FORGETFUL. 
DENIES PAIN OR DISCOMFORT. NO AGITATION NOTED AT THIS TIME.

## 2022-07-02 NOTE — NUR
GPS: PT SOMEWHAT CONFUSED AND FORGETFUL. A BIT RESISTIVE AND COMBATIVE WHEN GIVING CARE OR 
CHANGING DIAPER. PT ON CHAIR FOR SAFETY BUT GIVEN CHANCE TO AMBULATE WITH 2 PERSON ASSIST. 
PT WITH EPISODE OF BEING ANXIOUS BUT ABLE TO BE REDIRECTED.

## 2022-07-02 NOTE — NUR
Patient had 2 BMs last night. He was combative with the staff providing care. Multiple 
attempts to reorient and redirect this patient were unsuccessful. Safety Stratiges remain in 
place.

## 2022-07-03 VITALS — SYSTOLIC BLOOD PRESSURE: 130 MMHG | DIASTOLIC BLOOD PRESSURE: 70 MMHG

## 2022-07-03 VITALS — DIASTOLIC BLOOD PRESSURE: 61 MMHG | SYSTOLIC BLOOD PRESSURE: 100 MMHG

## 2022-07-03 VITALS — SYSTOLIC BLOOD PRESSURE: 142 MMHG | DIASTOLIC BLOOD PRESSURE: 55 MMHG

## 2022-07-03 RX ADMIN — LORAZEPAM PRN MG: 1 TABLET ORAL at 23:13

## 2022-07-03 RX ADMIN — ASPIRIN SCH MG: 81 TABLET, CHEWABLE ORAL at 09:12

## 2022-07-03 RX ADMIN — SENNOSIDES SCH TAB: 8.6 TABLET, COATED ORAL at 17:34

## 2022-07-03 RX ADMIN — ANORECTAL OINTMENT SCH APP: 15.7; .44; 24; 20.6 OINTMENT TOPICAL at 21:00

## 2022-07-03 RX ADMIN — DIVALPROEX SODIUM SCH MG: 125 CAPSULE ORAL at 09:22

## 2022-07-03 RX ADMIN — RIVASTIGMINE TARTRATE SCH MG: 1.5 CAPSULE ORAL at 09:12

## 2022-07-03 RX ADMIN — DIVALPROEX SODIUM SCH MG: 125 CAPSULE ORAL at 17:34

## 2022-07-03 RX ADMIN — DIVALPROEX SODIUM SCH MG: 125 CAPSULE ORAL at 12:22

## 2022-07-03 RX ADMIN — Medication SCH UNITS: at 09:13

## 2022-07-03 RX ADMIN — SENNOSIDES SCH TAB: 8.6 TABLET, COATED ORAL at 09:12

## 2022-07-03 RX ADMIN — RIVASTIGMINE TARTRATE SCH MG: 1.5 CAPSULE ORAL at 22:03

## 2022-07-03 RX ADMIN — LORAZEPAM PRN MG: 1 TABLET ORAL at 13:27

## 2022-07-03 RX ADMIN — DIVALPROEX SODIUM SCH MG: 250 TABLET, DELAYED RELEASE ORAL at 09:12

## 2022-07-03 RX ADMIN — TEMAZEPAM PRN MG: 7.5 CAPSULE ORAL at 22:03

## 2022-07-03 NOTE — NUR
Ativan 1 mg was given at 13:27 for anxiety, agitation, combativeness, will be monitored for 
effectiveness.

## 2022-07-03 NOTE — NUR
Received patient sleeping in his room. A X 1 to self. Pt. is confused, restless, 
disoriented, forgetful. Pt states "Yes, make that transfer" "I need to solve this problem" 
"Can I go with you?" "I'll be home soon, wait for me". Pt. requires total assistance. 
Reassurance given. Fall and safety precautions implemented.

## 2022-07-04 VITALS — DIASTOLIC BLOOD PRESSURE: 64 MMHG | SYSTOLIC BLOOD PRESSURE: 121 MMHG

## 2022-07-04 VITALS — SYSTOLIC BLOOD PRESSURE: 117 MMHG | DIASTOLIC BLOOD PRESSURE: 78 MMHG

## 2022-07-04 VITALS — DIASTOLIC BLOOD PRESSURE: 73 MMHG | SYSTOLIC BLOOD PRESSURE: 137 MMHG

## 2022-07-04 RX ADMIN — DIVALPROEX SODIUM SCH MG: 125 CAPSULE ORAL at 08:29

## 2022-07-04 RX ADMIN — ASPIRIN SCH MG: 81 TABLET, CHEWABLE ORAL at 08:29

## 2022-07-04 RX ADMIN — SENNOSIDES SCH TAB: 8.6 TABLET, COATED ORAL at 17:33

## 2022-07-04 RX ADMIN — SENNOSIDES SCH TAB: 8.6 TABLET, COATED ORAL at 08:30

## 2022-07-04 RX ADMIN — ANORECTAL OINTMENT SCH APP: 15.7; .44; 24; 20.6 OINTMENT TOPICAL at 12:53

## 2022-07-04 RX ADMIN — RIVASTIGMINE TARTRATE SCH MG: 1.5 CAPSULE ORAL at 08:29

## 2022-07-04 RX ADMIN — Medication SCH UNITS: at 12:52

## 2022-07-04 RX ADMIN — ANORECTAL OINTMENT SCH APP: 15.7; .44; 24; 20.6 OINTMENT TOPICAL at 21:07

## 2022-07-04 RX ADMIN — LORAZEPAM PRN MG: 1 TABLET ORAL at 12:51

## 2022-07-04 RX ADMIN — RIVASTIGMINE TARTRATE SCH MG: 1.5 CAPSULE ORAL at 21:06

## 2022-07-04 RX ADMIN — TEMAZEPAM PRN MG: 7.5 CAPSULE ORAL at 21:59

## 2022-07-04 RX ADMIN — DIVALPROEX SODIUM SCH MG: 125 CAPSULE ORAL at 12:51

## 2022-07-04 RX ADMIN — DIVALPROEX SODIUM SCH MG: 125 CAPSULE ORAL at 17:33

## 2022-07-04 NOTE — NUR
patient is confused and disoriented took all medication by crash med. agitated and restless 
at time, Ativan given 1251 pm as ordered. keep patient up to Kylie-chair to providence fall , 
will continue close monitori ng.

## 2022-07-05 VITALS — DIASTOLIC BLOOD PRESSURE: 75 MMHG | SYSTOLIC BLOOD PRESSURE: 108 MMHG

## 2022-07-05 VITALS — SYSTOLIC BLOOD PRESSURE: 122 MMHG | DIASTOLIC BLOOD PRESSURE: 63 MMHG

## 2022-07-05 VITALS — DIASTOLIC BLOOD PRESSURE: 71 MMHG | SYSTOLIC BLOOD PRESSURE: 141 MMHG

## 2022-07-05 RX ADMIN — DIVALPROEX SODIUM SCH MG: 125 CAPSULE ORAL at 12:17

## 2022-07-05 RX ADMIN — RIVASTIGMINE TARTRATE SCH MG: 1.5 CAPSULE ORAL at 08:30

## 2022-07-05 RX ADMIN — SENNOSIDES SCH TAB: 8.6 TABLET, COATED ORAL at 08:37

## 2022-07-05 RX ADMIN — SENNOSIDES SCH TAB: 8.6 TABLET, COATED ORAL at 16:40

## 2022-07-05 RX ADMIN — DIVALPROEX SODIUM SCH MG: 125 CAPSULE ORAL at 16:40

## 2022-07-05 RX ADMIN — ANORECTAL OINTMENT SCH APP: 15.7; .44; 24; 20.6 OINTMENT TOPICAL at 08:38

## 2022-07-05 RX ADMIN — DIVALPROEX SODIUM SCH MG: 125 CAPSULE ORAL at 08:29

## 2022-07-05 RX ADMIN — ACETAMINOPHEN PRN MG: 325 TABLET ORAL at 08:30

## 2022-07-05 RX ADMIN — RIVASTIGMINE TARTRATE SCH MG: 1.5 CAPSULE ORAL at 21:16

## 2022-07-05 RX ADMIN — Medication SCH UNITS: at 08:37

## 2022-07-05 RX ADMIN — TEMAZEPAM PRN MG: 7.5 CAPSULE ORAL at 22:35

## 2022-07-05 RX ADMIN — ASPIRIN SCH MG: 81 TABLET, CHEWABLE ORAL at 08:29

## 2022-07-05 RX ADMIN — ANORECTAL OINTMENT SCH APP: 15.7; .44; 24; 20.6 OINTMENT TOPICAL at 21:17

## 2022-07-05 RX ADMIN — LORAZEPAM PRN MG: 1 TABLET ORAL at 08:29

## 2022-07-05 NOTE — NUR
GPS:  PATIENT REMAIN  CONFUSED AND FORGETFUL. DENIES PAIN OR DISCOMFORT. NO AGITATION NOTED 
AT THIS TIME. SLEPT 4.15 HRS THROUGH THE NIGHT. RESTING ON BED COMFORTABLY.

## 2022-07-05 NOTE — NUR
RECEIVED PATIENT IN THE HALLWAY SITTING IN A DAPHNIE CHAIR NEAR THE NURSING STATION FOR 
SAFETY. HE IS NOTED AWAKE A/O X 1. HE IS CONFUSED HE IS RESTLESS AND HE IS HARD TO REDIRECT. 
HE IS UNABLE TO HAVE A MEANINGFUL CONVERSATION WITH THIS WRITER. PATIENT V/S ARE STABLE. HE 
WAS GIVEN PO FLUIDS AND SNACKS. SAFETY AND FALL PRECAUTIONS ARE IN PLACE. WILL CONTINUE TO 
MONITOR,

## 2022-07-05 NOTE — NUR
BREN Discharge Update:



Eleanor ([phone]) at [facility] contacted SW to discuss pt's current status 
and anticipated discharge day. BREN stated that pt is currently compliant with treatment. BREN 
stated that Dr. Bishop has not given a discharge order yet. Eleanor is aware and stated she 
will contact pt's son, Esequiel ([phone]) and update him as well.

## 2022-07-05 NOTE — NUR
Received patient is confused and disoriented took all medication by crash med. agitated and 
restless at time, Ativan given x1 as prn  as ordered.patient with poor insight and judgement 
 keep patient up to Kylie-chair to providence fall , will continue close monitoring

## 2022-07-06 VITALS — SYSTOLIC BLOOD PRESSURE: 97 MMHG | DIASTOLIC BLOOD PRESSURE: 58 MMHG

## 2022-07-06 VITALS — DIASTOLIC BLOOD PRESSURE: 117 MMHG | SYSTOLIC BLOOD PRESSURE: 145 MMHG

## 2022-07-06 VITALS — SYSTOLIC BLOOD PRESSURE: 126 MMHG | DIASTOLIC BLOOD PRESSURE: 64 MMHG

## 2022-07-06 RX ADMIN — DIVALPROEX SODIUM SCH MG: 125 CAPSULE ORAL at 17:14

## 2022-07-06 RX ADMIN — RIVASTIGMINE TARTRATE SCH MG: 1.5 CAPSULE ORAL at 08:55

## 2022-07-06 RX ADMIN — DIVALPROEX SODIUM SCH MG: 125 CAPSULE ORAL at 08:55

## 2022-07-06 RX ADMIN — ANORECTAL OINTMENT SCH APP: 15.7; .44; 24; 20.6 OINTMENT TOPICAL at 08:57

## 2022-07-06 RX ADMIN — ANORECTAL OINTMENT SCH APPLIC: 15.7; .44; 24; 20.6 OINTMENT TOPICAL at 20:50

## 2022-07-06 RX ADMIN — SENNOSIDES SCH TAB: 8.6 TABLET, COATED ORAL at 17:14

## 2022-07-06 RX ADMIN — Medication SCH UNITS: at 08:54

## 2022-07-06 RX ADMIN — TEMAZEPAM PRN MG: 7.5 CAPSULE ORAL at 22:57

## 2022-07-06 RX ADMIN — SENNOSIDES SCH TAB: 8.6 TABLET, COATED ORAL at 08:55

## 2022-07-06 RX ADMIN — DIVALPROEX SODIUM SCH MG: 125 CAPSULE ORAL at 13:24

## 2022-07-06 RX ADMIN — LORAZEPAM PRN MG: 1 TABLET ORAL at 15:46

## 2022-07-06 RX ADMIN — LORAZEPAM PRN MG: 1 TABLET ORAL at 00:35

## 2022-07-06 RX ADMIN — ASPIRIN SCH MG: 81 TABLET, CHEWABLE ORAL at 08:55

## 2022-07-06 RX ADMIN — RIVASTIGMINE TARTRATE SCH MG: 1.5 CAPSULE ORAL at 20:47

## 2022-07-06 NOTE — NUR
Received patient sleeping in his room. A X 1 to person. Pt. is anxious, restless, confused, 
disorganized, combative with nursing care at times. Pt states "Let's go" "I have to go" "I 
have plans for today" Pt. ambulates with assistance. Requires total assistance. Reality 
orientation provided. Fall and safety precautions implemented.

## 2022-07-06 NOTE — NUR
Patient is given Ativan 1 mg at 15:46 for agitation, restless, and combativeness, will be 
monitored for effectiveness.

## 2022-07-06 NOTE — NUR
BREN Family Contact:



SW left a voicemail for a call back for pt's son, Esequiel ([phone]) regarding pt's 
current status, treatment plan and discharge plan. BREN stated that per Dr. Bishop, there is 
currently no discharge order as pt is not yet stable for safe discharge.

## 2022-07-07 VITALS — SYSTOLIC BLOOD PRESSURE: 92 MMHG | DIASTOLIC BLOOD PRESSURE: 58 MMHG

## 2022-07-07 VITALS — DIASTOLIC BLOOD PRESSURE: 49 MMHG | SYSTOLIC BLOOD PRESSURE: 95 MMHG

## 2022-07-07 VITALS — SYSTOLIC BLOOD PRESSURE: 100 MMHG | DIASTOLIC BLOOD PRESSURE: 52 MMHG

## 2022-07-07 RX ADMIN — ANORECTAL OINTMENT SCH APPLIC: 15.7; .44; 24; 20.6 OINTMENT TOPICAL at 21:43

## 2022-07-07 RX ADMIN — DIVALPROEX SODIUM SCH MG: 125 CAPSULE ORAL at 17:21

## 2022-07-07 RX ADMIN — TEMAZEPAM PRN MG: 7.5 CAPSULE ORAL at 22:53

## 2022-07-07 RX ADMIN — DIVALPROEX SODIUM SCH MG: 125 CAPSULE ORAL at 13:24

## 2022-07-07 RX ADMIN — Medication SCH UNITS: at 08:54

## 2022-07-07 RX ADMIN — RIVASTIGMINE TARTRATE SCH MG: 1.5 CAPSULE ORAL at 08:56

## 2022-07-07 RX ADMIN — ASPIRIN SCH MG: 81 TABLET, CHEWABLE ORAL at 08:54

## 2022-07-07 RX ADMIN — RIVASTIGMINE TARTRATE SCH MG: 1.5 CAPSULE ORAL at 21:44

## 2022-07-07 RX ADMIN — LORAZEPAM PRN MG: 1 TABLET ORAL at 04:12

## 2022-07-07 RX ADMIN — DIVALPROEX SODIUM SCH MG: 125 CAPSULE ORAL at 08:54

## 2022-07-07 RX ADMIN — SENNOSIDES SCH TAB: 8.6 TABLET, COATED ORAL at 17:21

## 2022-07-07 RX ADMIN — SENNOSIDES SCH TAB: 8.6 TABLET, COATED ORAL at 08:54

## 2022-07-07 RX ADMIN — ANORECTAL OINTMENT SCH APPLIC: 15.7; .44; 24; 20.6 OINTMENT TOPICAL at 09:13

## 2022-07-07 NOTE — NUR
Became awake and restless around 0345. PRN A tivan given at 0412, AM care given, and 
assisted up in kiana chair. Is calmer now, remains awake, but less agitated, talking to self, 
quietly, at nurses station.

## 2022-07-07 NOTE — NUR
BREN Discharge Update:



BREN spoke with Eleanor ([phone]) from [facility]. BREN stated that per Dr. Bishop, pt is not ready for discharge yet. BREN stated this writer left a voicemail for the 
pt's son, Esequiel ([phone]) yesterday on 7/6/22 regarding the latest update. BREN stated 
this writer will follow-up with Dr. Bishop regarding an anticipated discharge date and 
contact Esequiel and Eleanor to inform. Eleanor was agreeable and stated she will update Esequiel 
as well regarding this conversation.

## 2022-07-07 NOTE — NUR
Received to care, up in kiana chair, confused, and disoriented. Compliant with medications. 
Fed snacks and given fluids. PRN Restoril given at bedtime, and assisted to bed. Has slept 
well, all night. No distress noted. Will continue to monitor closely.

## 2022-07-08 VITALS — DIASTOLIC BLOOD PRESSURE: 48 MMHG | SYSTOLIC BLOOD PRESSURE: 109 MMHG

## 2022-07-08 VITALS — DIASTOLIC BLOOD PRESSURE: 70 MMHG | SYSTOLIC BLOOD PRESSURE: 134 MMHG

## 2022-07-08 VITALS — SYSTOLIC BLOOD PRESSURE: 95 MMHG | DIASTOLIC BLOOD PRESSURE: 55 MMHG

## 2022-07-08 LAB
ALP SERPL-CCNC: 82 U/L (ref 50–136)
ALT SERPL W/O P-5'-P-CCNC: 21 U/L (ref 16–63)
AST SERPL-CCNC: 14 U/L (ref 15–37)
BILIRUB SERPL-MCNC: 0.4 MG/DL (ref 0.2–1)
BUN SERPL-MCNC: 39 MG/DL (ref 7–18)
CHLORIDE SERPL-SCNC: 104 MMOL/L (ref 98–107)
CO2 SERPL-SCNC: 29 MMOL/L (ref 21–32)
CREAT SERPL-MCNC: 1.6 MG/DL (ref 0.6–1.3)
GLUCOSE SERPL-MCNC: 124 MG/DL (ref 74–106)
POTASSIUM SERPL-SCNC: 4.9 MMOL/L (ref 3.5–5.1)
WS STN SPEC: 7.8 G/DL (ref 6.4–8.2)

## 2022-07-08 RX ADMIN — ANORECTAL OINTMENT SCH APPLIC: 15.7; .44; 24; 20.6 OINTMENT TOPICAL at 20:21

## 2022-07-08 RX ADMIN — DIVALPROEX SODIUM SCH MG: 125 CAPSULE ORAL at 12:47

## 2022-07-08 RX ADMIN — ASPIRIN SCH MG: 81 TABLET, CHEWABLE ORAL at 09:36

## 2022-07-08 RX ADMIN — LORAZEPAM PRN MG: 1 TABLET ORAL at 15:36

## 2022-07-08 RX ADMIN — DIVALPROEX SODIUM SCH MG: 125 CAPSULE ORAL at 16:24

## 2022-07-08 RX ADMIN — Medication SCH UNITS: at 09:38

## 2022-07-08 RX ADMIN — SENNOSIDES SCH TAB: 8.6 TABLET, COATED ORAL at 09:37

## 2022-07-08 RX ADMIN — ANORECTAL OINTMENT SCH APPLIC: 15.7; .44; 24; 20.6 OINTMENT TOPICAL at 10:01

## 2022-07-08 RX ADMIN — RIVASTIGMINE TARTRATE SCH MG: 1.5 CAPSULE ORAL at 20:20

## 2022-07-08 RX ADMIN — DIVALPROEX SODIUM SCH MG: 125 CAPSULE ORAL at 09:37

## 2022-07-08 RX ADMIN — SENNOSIDES SCH TAB: 8.6 TABLET, COATED ORAL at 16:26

## 2022-07-08 RX ADMIN — LORAZEPAM PRN MG: 1 TABLET ORAL at 03:45

## 2022-07-08 RX ADMIN — RIVASTIGMINE TARTRATE SCH MG: 1.5 CAPSULE ORAL at 09:37

## 2022-07-08 NOTE — NUR
Received lying in bed, initially restless until bedtime medications and Restoril were given. 
Slept most of night. Became awake and restless around 0330. Attempting to get out of bed. 
PRN Ativan given at 0345, AM care given, and assisted up in kiana chair. Is less agitated, 
talking to self, quietly, at nurses station. Dozing on and off intermittently.

## 2022-07-08 NOTE — NUR
Gps/Lvn- Stayed up in his kiana-chair, attended group therapy, interacts when engaged, 
confosed, speech incoherent. Patient was put to bed to rest, since patient was up since 
before breakfast, but patient kept climbing oob., potential for fall, confused, poor safety 
judgement. Kept up by the Nurses station, monitoring needs and safety

## 2022-07-09 VITALS — SYSTOLIC BLOOD PRESSURE: 111 MMHG | DIASTOLIC BLOOD PRESSURE: 62 MMHG

## 2022-07-09 VITALS — SYSTOLIC BLOOD PRESSURE: 156 MMHG | DIASTOLIC BLOOD PRESSURE: 86 MMHG

## 2022-07-09 VITALS — DIASTOLIC BLOOD PRESSURE: 43 MMHG | SYSTOLIC BLOOD PRESSURE: 109 MMHG

## 2022-07-09 LAB
APPEARANCE UR: CLEAR
BILIRUB UR QL STRIP: NEGATIVE
COLOR UR: YELLOW
DEPRECATED SQUAMOUS URNS QL MICRO: (no result) /HPF
GLUCOSE UR STRIP-MCNC: NEGATIVE MG/DL
HGB UR QL STRIP: (no result)
KETONES UR STRIP-MCNC: (no result) MG/DL
LEUKOCYTE ESTERASE UR QL STRIP: NEGATIVE
NITRITE UR QL STRIP: NEGATIVE
PH UR STRIP: 5.5 [PH] (ref 5–8)
RBC #/AREA URNS HPF: (no result) /HPF (ref 0–3)
SP GR UR STRIP: 1.02 (ref 1–1.03)
UROBILINOGEN UR STRIP-MCNC: 0.2 E.U./DL
WBC #/AREA URNS HPF: (no result) /HPF
WBC #/AREA URNS HPF: (no result) /HPF (ref 0–3)

## 2022-07-09 RX ADMIN — SENNOSIDES SCH TAB: 8.6 TABLET, COATED ORAL at 08:01

## 2022-07-09 RX ADMIN — DIVALPROEX SODIUM SCH MG: 125 CAPSULE ORAL at 12:18

## 2022-07-09 RX ADMIN — RIVASTIGMINE TARTRATE SCH MG: 1.5 CAPSULE ORAL at 20:02

## 2022-07-09 RX ADMIN — ANORECTAL OINTMENT SCH APPLIC: 15.7; .44; 24; 20.6 OINTMENT TOPICAL at 20:02

## 2022-07-09 RX ADMIN — SENNOSIDES SCH TAB: 8.6 TABLET, COATED ORAL at 16:18

## 2022-07-09 RX ADMIN — Medication SCH UNITS: at 08:01

## 2022-07-09 RX ADMIN — RIVASTIGMINE TARTRATE SCH MG: 1.5 CAPSULE ORAL at 08:00

## 2022-07-09 RX ADMIN — DIVALPROEX SODIUM SCH MG: 125 CAPSULE ORAL at 16:18

## 2022-07-09 RX ADMIN — DIVALPROEX SODIUM SCH MG: 125 CAPSULE ORAL at 08:00

## 2022-07-09 RX ADMIN — ASPIRIN SCH MG: 81 TABLET, CHEWABLE ORAL at 08:00

## 2022-07-09 RX ADMIN — ANORECTAL OINTMENT SCH APPLIC: 15.7; .44; 24; 20.6 OINTMENT TOPICAL at 06:15

## 2022-07-09 NOTE — NUR
Patient slept well. 7.45 hours. He is up early this am. Very confused, but non combative. Am 
care provided and oral fluids encouraged.This writer will continue to obtain a urine sample 
per order, but d/t incontinence, it has been difficult. Safety Stratiges remain in place .

## 2022-07-09 NOTE — NUR
PT RECEIVED SITTING UP ON BELLA CHAIR IN UNIT HALLWAY. CONFUSED AND DISORIENTED. NO 
AGGRESSIVE OR COMBATIVE BEHAVIOR NOTED. COMPLIANT WITH MEDICATIONS WITH REINFORCEMENT. TOTAL 
CARE. REQUIRES EXTENSIVE ASSIST WITH ADLS. EASILY IRRITABLE AT TIMES.

## 2022-07-10 VITALS — DIASTOLIC BLOOD PRESSURE: 44 MMHG | SYSTOLIC BLOOD PRESSURE: 114 MMHG

## 2022-07-10 VITALS — DIASTOLIC BLOOD PRESSURE: 78 MMHG | SYSTOLIC BLOOD PRESSURE: 144 MMHG

## 2022-07-10 VITALS — SYSTOLIC BLOOD PRESSURE: 95 MMHG | DIASTOLIC BLOOD PRESSURE: 47 MMHG

## 2022-07-10 LAB
ALP SERPL-CCNC: 76 U/L (ref 50–136)
ALT SERPL W/O P-5'-P-CCNC: 29 U/L (ref 16–63)
AST SERPL-CCNC: 34 U/L (ref 15–37)
BILIRUB SERPL-MCNC: 0.5 MG/DL (ref 0.2–1)
BUN SERPL-MCNC: 34 MG/DL (ref 7–18)
CHLORIDE SERPL-SCNC: 105 MMOL/L (ref 98–107)
CO2 SERPL-SCNC: 31 MMOL/L (ref 21–32)
CREAT SERPL-MCNC: 1.4 MG/DL (ref 0.6–1.3)
GLUCOSE SERPL-MCNC: 104 MG/DL (ref 74–106)
POTASSIUM SERPL-SCNC: 4.3 MMOL/L (ref 3.5–5.1)
WS STN SPEC: 7 G/DL (ref 6.4–8.2)

## 2022-07-10 RX ADMIN — RIVASTIGMINE TARTRATE SCH MG: 1.5 CAPSULE ORAL at 10:09

## 2022-07-10 RX ADMIN — SENNOSIDES SCH TAB: 8.6 TABLET, COATED ORAL at 09:00

## 2022-07-10 RX ADMIN — ANORECTAL OINTMENT SCH APPLIC: 15.7; .44; 24; 20.6 OINTMENT TOPICAL at 06:02

## 2022-07-10 RX ADMIN — ANORECTAL OINTMENT SCH APPLIC: 15.7; .44; 24; 20.6 OINTMENT TOPICAL at 20:35

## 2022-07-10 RX ADMIN — LORAZEPAM PRN MG: 1 TABLET ORAL at 19:58

## 2022-07-10 RX ADMIN — DIVALPROEX SODIUM SCH MG: 125 CAPSULE ORAL at 10:09

## 2022-07-10 RX ADMIN — DIVALPROEX SODIUM SCH MG: 125 CAPSULE ORAL at 13:05

## 2022-07-10 RX ADMIN — ACETAMINOPHEN PRN MG: 325 TABLET ORAL at 00:46

## 2022-07-10 RX ADMIN — TEMAZEPAM PRN MG: 7.5 CAPSULE ORAL at 00:46

## 2022-07-10 RX ADMIN — Medication SCH UNITS: at 10:13

## 2022-07-10 RX ADMIN — LORAZEPAM PRN MG: 1 TABLET ORAL at 13:07

## 2022-07-10 RX ADMIN — ASPIRIN SCH MG: 81 TABLET, CHEWABLE ORAL at 10:08

## 2022-07-10 RX ADMIN — DIVALPROEX SODIUM SCH MG: 125 CAPSULE ORAL at 12:50

## 2022-07-10 RX ADMIN — SENNOSIDES SCH TAB: 8.6 TABLET, COATED ORAL at 17:00

## 2022-07-10 RX ADMIN — RIVASTIGMINE TARTRATE SCH MG: 1.5 CAPSULE ORAL at 20:35

## 2022-07-10 NOTE — NUR
Patient very confused pulling at chairs  and moving about in kiana chair, hand fed diets fair 
intake,   assisted with adls  and safety  continue treatment plans

## 2022-07-10 NOTE — NUR
patient too lethargic to swallow  medications patient drooling when 
drinking water . Patient received  sleeping pill last night, no distress noted just drowsy. 
Continue to monitor for alertness

## 2022-07-11 VITALS — SYSTOLIC BLOOD PRESSURE: 153 MMHG | DIASTOLIC BLOOD PRESSURE: 74 MMHG

## 2022-07-11 VITALS — DIASTOLIC BLOOD PRESSURE: 61 MMHG | SYSTOLIC BLOOD PRESSURE: 151 MMHG

## 2022-07-11 VITALS — SYSTOLIC BLOOD PRESSURE: 118 MMHG | DIASTOLIC BLOOD PRESSURE: 63 MMHG

## 2022-07-11 RX ADMIN — ASPIRIN SCH MG: 81 TABLET, CHEWABLE ORAL at 10:49

## 2022-07-11 RX ADMIN — LORAZEPAM PRN MG: 1 TABLET ORAL at 03:05

## 2022-07-11 RX ADMIN — SENNOSIDES SCH TAB: 8.6 TABLET, COATED ORAL at 10:52

## 2022-07-11 RX ADMIN — DIVALPROEX SODIUM SCH MG: 125 CAPSULE ORAL at 12:25

## 2022-07-11 RX ADMIN — ANORECTAL OINTMENT SCH APPLIC: 15.7; .44; 24; 20.6 OINTMENT TOPICAL at 20:33

## 2022-07-11 RX ADMIN — DIVALPROEX SODIUM SCH MG: 125 CAPSULE ORAL at 17:07

## 2022-07-11 RX ADMIN — RIVASTIGMINE TARTRATE SCH MG: 1.5 CAPSULE ORAL at 10:48

## 2022-07-11 RX ADMIN — Medication SCH UNITS: at 10:49

## 2022-07-11 RX ADMIN — ANORECTAL OINTMENT SCH APPLIC: 15.7; .44; 24; 20.6 OINTMENT TOPICAL at 10:49

## 2022-07-11 RX ADMIN — DIVALPROEX SODIUM SCH MG: 125 CAPSULE ORAL at 10:49

## 2022-07-11 RX ADMIN — RIVASTIGMINE TARTRATE SCH MG: 1.5 CAPSULE ORAL at 20:36

## 2022-07-11 RX ADMIN — SENNOSIDES SCH TAB: 8.6 TABLET, COATED ORAL at 17:07

## 2022-07-11 NOTE — NUR
GPS: PT JUST WOKE UP LATE AND WAS PLACED TO CHAIR FOR SAFETY. RISISTIVE TO CARE SPECIALLY 
WITH ADL'S. PT COMPLIANT TO MEDICATIONS. NO AGITATION NOTED AT THIS TIME.

## 2022-07-11 NOTE — NUR
GPS: Nursing Notes: Sleeping Hours:

Patient able to sleep 6 hours and 15 minutes last night, continue to monitor for safety, 
continue with treatment plan.

## 2022-07-11 NOTE — NUR
GPS: Nursing Notes: Thought Disorder:

Patient is awake and responding to his name, impaired judgment, resistant with nursing care, 
disoriented, disorganized, confused, gets easily agitated when assisting him with his ADL's, 
poor impulse control, banging on the table, needs assistance with ambulation, unsteady and 
weak gait, unable to formulate a viable plan for self care, episodes of pushing staff away 
when changing his wet diaper, redirected and reoriented during shift, continue to monitor 
for safety, continue with treatment plan.

## 2022-07-11 NOTE — NUR
BREN Family Contact:



BREN contacted pt's son and DPOA, Esequiel ([phone]) and left a voicemail for a call back 
regarding pt's new discharge details for date.

## 2022-07-11 NOTE — NUR
GPS: PT COMPLIANT WITH CARE AND MEDS. RESISTIVE WITH ADL'S. PT WITH EPISODE OF PUSHING THE 
CHAIR BACKWARD AND PUSHING THE FOOD TRAY. MAXIMUM ASSISTANCE WITH FEEDING.

## 2022-07-12 VITALS — DIASTOLIC BLOOD PRESSURE: 76 MMHG | SYSTOLIC BLOOD PRESSURE: 147 MMHG

## 2022-07-12 VITALS — SYSTOLIC BLOOD PRESSURE: 141 MMHG | DIASTOLIC BLOOD PRESSURE: 79 MMHG

## 2022-07-12 VITALS — DIASTOLIC BLOOD PRESSURE: 69 MMHG | SYSTOLIC BLOOD PRESSURE: 129 MMHG

## 2022-07-12 RX ADMIN — Medication SCH UNITS: at 09:17

## 2022-07-12 RX ADMIN — LORAZEPAM PRN MG: 1 TABLET ORAL at 12:10

## 2022-07-12 RX ADMIN — DIVALPROEX SODIUM SCH MG: 125 CAPSULE ORAL at 09:17

## 2022-07-12 RX ADMIN — DIVALPROEX SODIUM SCH MG: 125 CAPSULE ORAL at 17:20

## 2022-07-12 RX ADMIN — RIVASTIGMINE TARTRATE SCH MG: 1.5 CAPSULE ORAL at 20:02

## 2022-07-12 RX ADMIN — DIVALPROEX SODIUM SCH MG: 125 CAPSULE ORAL at 12:10

## 2022-07-12 RX ADMIN — ASPIRIN SCH MG: 81 TABLET, CHEWABLE ORAL at 09:17

## 2022-07-12 RX ADMIN — SENNOSIDES SCH TAB: 8.6 TABLET, COATED ORAL at 09:17

## 2022-07-12 RX ADMIN — ANORECTAL OINTMENT SCH APPLIC: 15.7; .44; 24; 20.6 OINTMENT TOPICAL at 09:18

## 2022-07-12 RX ADMIN — ANORECTAL OINTMENT SCH APPLIC: 15.7; .44; 24; 20.6 OINTMENT TOPICAL at 20:02

## 2022-07-12 RX ADMIN — Medication SCH ML: at 17:21

## 2022-07-12 RX ADMIN — ACETAMINOPHEN PRN MG: 325 TABLET ORAL at 10:12

## 2022-07-12 RX ADMIN — ACETAMINOPHEN PRN MG: 325 TABLET ORAL at 17:20

## 2022-07-12 RX ADMIN — SENNOSIDES SCH TAB: 8.6 TABLET, COATED ORAL at 17:34

## 2022-07-12 RX ADMIN — RIVASTIGMINE TARTRATE SCH MG: 1.5 CAPSULE ORAL at 10:11

## 2022-07-12 RX ADMIN — TEMAZEPAM PRN MG: 7.5 CAPSULE ORAL at 00:59

## 2022-07-12 NOTE — NUR
Received patient in the hallway sitting in a pantera chair closed to the nursing station. He 
was noted confused and disorganized. pt is unable to have a meaningful conversation with 
this writer. No aggressive or combative bx noted at this time, patient is able to accept 
care as he needs help with ADLs. His v/s are stable. he is in no distress. He was given PO 
fluids and snacks. safety and fall precaution are in place. will continue to monitor.

## 2022-07-12 NOTE — NUR
GPS: Nursing Notes: Thought Disorder:

Patient is awake and responding to his name, disoriented, confused, disorganized, poor 
judgment, impaired judgment, gets easily irritable when assisting him with ADL's, pushing 
staff away when helping him with diaper change, poor impulse control at times, unkempt 
appearance, unable to formulate a viable plan for self care, continue to monitor for safety, 
continue with treatment plan.

## 2022-07-12 NOTE — NUR
BREN Family Contact:



BREN contacted pt's son and DPOA, Esequiel ([phone]) and discussed pt's new discharge plan 
for Wednesday, the 13th, 2022 to return to [facility]. BREN stated this writer 
spoke to Eleanor ([phone]) from [place] who confirmed pt's return on Wednesday. Esequiel 
is aware and agreeable.

## 2022-07-12 NOTE — NUR
SW Discharge Update:



SW contacted Eleanor ([phone]) from [address], 
[place] [address] regarding pt's discharge confirmation. Per Eleanor after providing an in 
person consultation, pt is accepted to return to their facility on 7/12/22. Pt's son, Esequiel 
([phone]) is aware and agreeable per conversation with the SW today.

## 2022-07-13 VITALS — SYSTOLIC BLOOD PRESSURE: 127 MMHG | DIASTOLIC BLOOD PRESSURE: 61 MMHG

## 2022-07-13 LAB
BUN SERPL-MCNC: 42 MG/DL (ref 7–18)
CHLORIDE SERPL-SCNC: 105 MMOL/L (ref 98–107)
CO2 SERPL-SCNC: 29 MMOL/L (ref 21–32)
CREAT SERPL-MCNC: 1.3 MG/DL (ref 0.6–1.3)
GLUCOSE SERPL-MCNC: 101 MG/DL (ref 74–106)
POTASSIUM SERPL-SCNC: 4.3 MMOL/L (ref 3.5–5.1)

## 2022-07-13 RX ADMIN — RIVASTIGMINE TARTRATE SCH MG: 1.5 CAPSULE ORAL at 08:46

## 2022-07-13 RX ADMIN — Medication SCH UNITS: at 08:46

## 2022-07-13 RX ADMIN — ASPIRIN SCH MG: 81 TABLET, CHEWABLE ORAL at 08:45

## 2022-07-13 RX ADMIN — DIVALPROEX SODIUM SCH MG: 125 CAPSULE ORAL at 08:46

## 2022-07-13 RX ADMIN — SENNOSIDES SCH TAB: 8.6 TABLET, COATED ORAL at 08:46

## 2022-07-13 RX ADMIN — Medication SCH ML: at 08:47

## 2022-07-13 RX ADMIN — ANORECTAL OINTMENT SCH APPLIC: 15.7; .44; 24; 20.6 OINTMENT TOPICAL at 08:54

## 2022-07-13 RX ADMIN — DIVALPROEX SODIUM SCH MG: 125 CAPSULE ORAL at 12:31

## 2022-07-13 NOTE — NUR
Received orders to discharge this patient to [facility] by [facility]. All belongings were returned to the patient. Pt. denies pain or any discomfort, 
SOB. Patient left the unit at 13:25. Emotional support provided. Fall and safety precautions 
implemented.

## 2022-07-13 NOTE — NUR
Received patient sleeping in his room. A/O X 1 to self. Pt. is confused, disoriented, flat, 
disorganized, combative at times. Pt. is compliant with medications. Total care. Reassurance 
given. Fall and safety precautions implemented.

## 2023-07-29 NOTE — NUR
Court Hearing:

Patient's court hearing for 2600 was today and it was upheld for GD and danger to others. NPO